# Patient Record
Sex: FEMALE | Race: WHITE | Employment: OTHER | RURAL
[De-identification: names, ages, dates, MRNs, and addresses within clinical notes are randomized per-mention and may not be internally consistent; named-entity substitution may affect disease eponyms.]

---

## 2017-01-12 ENCOUNTER — OFFICE VISIT (OUTPATIENT)
Dept: FAMILY MEDICINE CLINIC | Age: 77
End: 2017-01-12

## 2017-01-12 VITALS
TEMPERATURE: 98.2 F | WEIGHT: 156 LBS | DIASTOLIC BLOOD PRESSURE: 60 MMHG | BODY MASS INDEX: 30.63 KG/M2 | SYSTOLIC BLOOD PRESSURE: 112 MMHG | HEIGHT: 60 IN | HEART RATE: 93 BPM | OXYGEN SATURATION: 94 % | RESPIRATION RATE: 20 BRPM

## 2017-01-12 DIAGNOSIS — E11.9 TYPE 2 DIABETES MELLITUS WITHOUT COMPLICATION, WITHOUT LONG-TERM CURRENT USE OF INSULIN (HCC): Primary | ICD-10-CM

## 2017-01-12 DIAGNOSIS — I10 ESSENTIAL HYPERTENSION: ICD-10-CM

## 2017-01-12 DIAGNOSIS — E78.00 PURE HYPERCHOLESTEROLEMIA: ICD-10-CM

## 2017-01-12 DIAGNOSIS — J06.9 VIRAL UPPER RESPIRATORY TRACT INFECTION: ICD-10-CM

## 2017-01-12 DIAGNOSIS — M1A.9XX0 CHRONIC GOUT WITHOUT TOPHUS, UNSPECIFIED CAUSE, UNSPECIFIED SITE: ICD-10-CM

## 2017-01-12 RX ORDER — CODEINE PHOSPHATE AND GUAIFENESIN 10; 100 MG/5ML; MG/5ML
5 SOLUTION ORAL
Qty: 120 ML | Refills: 1 | Status: SHIPPED | OUTPATIENT
Start: 2017-01-12 | End: 2018-08-20 | Stop reason: ALTCHOICE

## 2017-01-12 NOTE — PROGRESS NOTES
Mikaela Murphy is a 78 y.o. female presenting for/with:    No chief complaint on file. HPI:  Having increased cough, chest congestion, for past 4days, gradually improving. Wet, no heme. No dyspnea, no f/c. Daughter she visited the other day had bronchitis sx. Diabetes. Sugars controlled well since last visit. Running mid 100's on on meter. Hypoglycemia: none  Tolerating current treatment well  Current medications include Metformin ER  2000mg every day and januvia 100mg qd. Lab Results   Component Value Date/Time    Hemoglobin A1c 6.6 09/15/2016 10:15 AM    Hemoglobin A1c 6.6 04/04/2016 12:01 PM    Hemoglobin A1c 6.5 06/11/2015 01:03 PM    Glucose 79 09/15/2016 10:15 AM    Microalb/Creat ratio (ug/mg creat.) 25.5 04/04/2016 12:02 PM    LDL, calculated Comment 09/15/2016 10:15 AM    Creatinine 1.04 09/15/2016 10:15 AM     Lab Results   Component Value Date/Time    Microalb/Creat ratio (ug/mg creat.) 25.5 04/04/2016 12:02 PM    Microalbumin, urine 32.7 04/04/2016 12:02 PM     Lab Results   Component Value Date/Time    Sodium 143 09/15/2016 10:15 AM    Potassium 5.0 09/15/2016 10:15 AM    Chloride 99 09/15/2016 10:15 AM    CO2 26 09/15/2016 10:15 AM    Glucose 79 09/15/2016 10:15 AM    BUN 27 09/15/2016 10:15 AM    Creatinine 1.04 09/15/2016 10:15 AM    BUN/Creatinine ratio 26 09/15/2016 10:15 AM    GFR est AA 61 09/15/2016 10:15 AM    GFR est non-AA 53 09/15/2016 10:15 AM    Calcium 11.3 09/15/2016 10:15 AM     Last eye exam performed 1/23/15, Dr. Hernan Sarmiento at [address] [place] [address]. Ofc, no DR per pt. Last foot exam 11/15 with podiatry in [address] [place] [address], no sores, did have ingrowing R hallux, did well with tx    Hyperlipidemia. Had some more headaches last mo. Held her crestor 5mg for a couple days, sx went away. Has remained on 1/week, jax well. Higher doses have caused myalgias.   Lab Results   Component Value Date/Time    Cholesterol, total 193 09/15/2016 10:15 AM    HDL Cholesterol 31 09/15/2016 10:15 AM    LDL, calculated Comment 09/15/2016 10:15 AM    VLDL, calculated Comment 09/15/2016 10:15 AM    Triglyceride 507 09/15/2016 10:15 AM     Lab Results   Component Value Date/Time    ALT 23 09/15/2016 10:15 AM    AST 22 09/15/2016 10:15 AM    Alk. phosphatase 59 09/15/2016 10:15 AM    Bilirubin, total 0.2 09/15/2016 10:15 AM     PMH, SH, Medications/Allergies: reviewed, on chart. ROS:  Constitutional: No fever, chills or weight loss  Respiratory: No cough, SOB   CV: No chest pain or Palpitations    Visit Vitals    /60 (BP 1 Location: Left arm, BP Patient Position: Sitting)    Pulse 93    Temp 98.2 °F (36.8 °C) (Temporal)    Resp 20    Ht 5' (1.524 m)    Wt 156 lb (70.8 kg)    SpO2 94%    BMI 30.47 kg/m2     Wt Readings from Last 3 Encounters:   01/12/17 156 lb (70.8 kg)   09/15/16 158 lb (71.7 kg)   04/04/16 162 lb (73.5 kg)     Physical Examination: General appearance - alert, well appearing, and in no distress  Mental status - alert, oriented to person, place, and time  Eyes - pupils equal and reactive, extraocular eye movements intact  ENT - bilateral external ears and nose normal. Normal lips  Neck - supple, no significant adenopathy, no thyromegaly or mass  Lymphatics - no palpable lymphadenopathy, no hepatosplenomegaly  Chest - clear to auscultation, no wheezes, rales or rhonchi, symmetric air entry. Heart - normal rate, regular rhythm, normal S1, S2, no murmurs, rubs, clicks or gallops  Extremities - peripheral pulses normal, no pedal edema, no clubbing or cyanosis    A/P:  URI with chest congestion. Mild, improving. Tx cough with robitussin with codeine PRN. DM2  doing well on metformin and Januvia. Plan Check A1c/CMP at Spring visit. Lipids  Norman the crestor 5mg BIW, good lipids on last check. Con't, plan recheck Fall 2017. HTN  Doing well on lisinopril. Con't. F/U 5-6mo for routine visit/prn.

## 2017-01-12 NOTE — MR AVS SNAPSHOT
Visit Information Date & Time Provider Department Dept. Phone Encounter #  
 1/12/2017 10:50 AM Shaye Sykes MD [address] [place] [address] [id number] Follow-up Instructions Return in about 5 months (around 6/12/2017). Upcoming Health Maintenance Date Due  
 GLAUCOMA SCREENING Q2Y 7/28/2007 DTaP/Tdap/Td series (1 - Tdap) 8/5/2009 EYE EXAM RETINAL OR DILATED Q1 1/14/2016 FOOT EXAM Q1 11/18/2016 HEMOGLOBIN A1C Q6M 3/15/2017 MICROALBUMIN Q1 4/4/2017 MEDICARE YEARLY EXAM 4/5/2017 LIPID PANEL Q1 9/15/2017 COLONOSCOPY 2/1/2020 Allergies as of 1/12/2017  Review Complete On: 1/12/2017 By: Shaye Sykes MD  
  
 Severity Noted Reaction Type Reactions Streptomycin  07/16/2013    Unknown (comments) Current Immunizations  Reviewed on 9/15/2016 Name Date Influenza High Dose Vaccine PF 9/8/2016, 10/8/2015 Influenza Vaccine 11/17/2014 Pneumococcal Conjugate (PCV-13) 12/17/2014 Pneumococcal Vaccine (Unspecified Type) 8/4/2009 Td 8/4/2009 Zoster Vaccine, Live 12/11/2015 Not reviewed this visit You Were Diagnosed With   
  
 Codes Comments Type 2 diabetes mellitus without complication, without long-term current use of insulin (HCC)    -  Primary ICD-10-CM: E11.9 ICD-9-CM: 250.00 Chronic gout without tophus, unspecified cause, unspecified site     ICD-10-CM: M1A. 9XX0 
ICD-9-CM: 274.02 Essential hypertension     ICD-10-CM: I10 
ICD-9-CM: 401.9 Pure hypercholesterolemia     ICD-10-CM: E78.00 ICD-9-CM: 272.0 Viral upper respiratory tract infection     ICD-10-CM: J06.9, B97.89 ICD-9-CM: 465.9 Vitals BP Pulse Temp Resp Height(growth percentile) 112/60 (BP 1 Location: Left arm, BP Patient Position: Sitting) 93 98.2 °F (36.8 °C) (Temporal) 20 5' (1.524 m) Weight(growth percentile) SpO2 BMI OB Status Smoking Status 156 lb (70.8 kg) 94% 30.47 kg/m2 Hysterectomy Never Smoker Vitals History BMI and BSA Data Body Mass Index Body Surface Area  
 30.47 kg/m 2 1.73 m 2 Preferred Pharmacy Pharmacy Name Phone [facility], [address] [facility] [phone] Your Updated Medication List  
  
   
This list is accurate as of: 1/12/17 12:37 PM.  Always use your most recent med list.  
  
  
  
  
 ALEVE 220 mg Cap Generic drug:  naproxen sodium Take 1 tablet by mouth daily. allopurinol 300 mg tablet Commonly known as:  ZYLOPRIM  
TAKE ONE TABLET BY MOUTH ONCE DAILY  
  
 aspirin delayed-release 81 mg tablet Take  by mouth daily. glucose blood VI test strips strip Commonly known as:  ASCENSIA AUTODISC VI, ONE TOUCH ULTRA TEST VI  
Use to check sugar daily dx 250.00, on insulin. guaiFENesin-codeine 100-10 mg/5 mL solution Commonly known as:  ROBITUSSIN AC Take 5 mL by mouth three (3) times daily as needed for Cough. Max Daily Amount: 15 mL. Lancets Misc Commonly known as: One Touch Echo Gowen Use to check sugar daily  
  
 lisinopril 20 mg tablet Commonly known as:  PRINIVIL, ZESTRIL  
TAKE ONE TABLET BY MOUTH ONCE DAILY  
  
 magnesium oxide 400 mg tablet Commonly known as:  MAG-OX Take 400 mg by mouth daily. metFORMIN  mg tablet Commonly known as:  GLUCOPHAGE XR Take 4 Tabs by mouth daily. Indications: sugar  
  
 multivitamin tablet Commonly known as:  ONE A DAY Take 1 tablet by mouth daily. rosuvastatin 5 mg tablet Commonly known as:  CRESTOR Take 1 Tab by mouth two (2) times a week. SITagliptin 100 mg tablet Commonly known as:  Thirza Calkin Take 1 Tab by mouth daily. Indications: sugar. Prescriptions Printed Refills  
 guaiFENesin-codeine (ROBITUSSIN AC) 100-10 mg/5 mL solution 1 Sig: Take 5 mL by mouth three (3) times daily as needed for Cough. Max Daily Amount: 15 mL. Class: Print Route: Oral  
  
Follow-up Instructions Return in about 5 months (around 6/12/2017). Patient Instructions Viral Respiratory Infection: Care Instructions Your Care Instructions Viruses are very small organisms. They grow in number after they enter your body. There are many types that cause different illnesses, such as colds and the mumps. The symptoms of a viral respiratory infection often start quickly. They include a fever, sore throat, and runny nose. You may also just not feel well. Or you may not want to eat much. Most viral respiratory infections are not serious. They usually get better with time and self-care. Antibiotics are not used to treat a viral infection. That's because antibiotics will not help cure a viral illness. In some cases, antiviral medicine can help your body fight a serious viral infection. Follow-up care is a key part of your treatment and safety. Be sure to make and go to all appointments, and call your doctor if you are having problems. It's also a good idea to know your test results and keep a list of the medicines you take. How can you care for yourself at home? · Rest as much as possible until you feel better. · Be safe with medicines. Take your medicine exactly as prescribed. Call your doctor if you think you are having a problem with your medicine. You will get more details on the specific medicine your doctor prescribes. · Take an over-the-counter pain medicine, such as acetaminophen (Tylenol), ibuprofen (Advil, Motrin), or naproxen (Aleve), as needed for pain and fever. Read and follow all instructions on the label. Do not give aspirin to anyone younger than 20. It has been linked to Reye syndrome, a serious illness. · Drink plenty of fluids, enough so that your urine is light yellow or clear like water. Hot fluids, such as tea or soup, may help relieve congestion in your nose and throat.  If you have kidney, heart, or liver disease and have to limit fluids, talk with your doctor before you increase the amount of fluids you drink. · Try to clear mucus from your lungs by breathing deeply and coughing. · Gargle with warm salt water once an hour. This can help reduce swelling and throat pain. Use 1 teaspoon of salt mixed in 1 cup of warm water. · Do not smoke or allow others to smoke around you. If you need help quitting, talk to your doctor about stop-smoking programs and medicines. These can increase your chances of quitting for good. To avoid spreading the virus · Cough or sneeze into a tissue. Then throw the tissue away. · If you don't have a tissue, use your hand to cover your cough or sneeze. Then clean your hand. You can also cough into your sleeve. · Wash your hands often. Use soap and warm water. Wash for 15 to 20 seconds each time. · If you don't have soap and water near you, you can clean your hands with alcohol wipes or gel. When should you call for help? Call your doctor now or seek immediate medical care if: 
· You have a new or higher fever. · Your fever lasts more than 48 hours. · You have trouble breathing. · You have a fever with a stiff neck or a severe headache. · You are sensitive to light. · You feel very sleepy or confused. Watch closely for changes in your health, and be sure to contact your doctor if: 
· You do not get better as expected. Where can you learn more? Go to http://mili-garima.info/. Enter H470 in the search box to learn more about \"Viral Respiratory Infection: Care Instructions. \" Current as of: June 30, 2016 Content Version: 11.1 © 9989-1197 [employer]. Care instructions adapted under license by [employer] (which disclaims liability or warranty for this information). If you have questions about a medical condition or this instruction, always ask your healthcare professional. [address] any warranty or liability for your use of this information. Introducing [facility] & HEALTH SERVICES! Dear Briseyda Nelson: Thank you for requesting a GetMaid account. Our records indicate that you already have an active GetMaid account. You can access your account anytime at [email or web address] Did you know that you can access your hospital and ER discharge instructions at any time in GetMaid? You can also review all of your test results from your hospital stay or ER visit. Additional Information If you have questions, please visit the Frequently Asked Questions section of the GetMaid website at [email or web address] Remember, GetMaid is NOT to be used for urgent needs. For medical emergencies, dial 911. Now available from your iPhone and Android! Please provide this summary of care documentation to your next provider. Your primary care clinician is listed as Arsenio Michele. If you have any questions after today's visit, please call [phone].

## 2017-01-12 NOTE — PATIENT INSTRUCTIONS
Viral Respiratory Infection: Care Instructions  Your Care Instructions  Viruses are very small organisms. They grow in number after they enter your body. There are many types that cause different illnesses, such as colds and the mumps. The symptoms of a viral respiratory infection often start quickly. They include a fever, sore throat, and runny nose. You may also just not feel well. Or you may not want to eat much. Most viral respiratory infections are not serious. They usually get better with time and self-care. Antibiotics are not used to treat a viral infection. That's because antibiotics will not help cure a viral illness. In some cases, antiviral medicine can help your body fight a serious viral infection. Follow-up care is a key part of your treatment and safety. Be sure to make and go to all appointments, and call your doctor if you are having problems. It's also a good idea to know your test results and keep a list of the medicines you take. How can you care for yourself at home? · Rest as much as possible until you feel better. · Be safe with medicines. Take your medicine exactly as prescribed. Call your doctor if you think you are having a problem with your medicine. You will get more details on the specific medicine your doctor prescribes. · Take an over-the-counter pain medicine, such as acetaminophen (Tylenol), ibuprofen (Advil, Motrin), or naproxen (Aleve), as needed for pain and fever. Read and follow all instructions on the label. Do not give aspirin to anyone younger than 20. It has been linked to Reye syndrome, a serious illness. · Drink plenty of fluids, enough so that your urine is light yellow or clear like water. Hot fluids, such as tea or soup, may help relieve congestion in your nose and throat. If you have kidney, heart, or liver disease and have to limit fluids, talk with your doctor before you increase the amount of fluids you drink.   · Try to clear mucus from your lungs by breathing deeply and coughing. · Gargle with warm salt water once an hour. This can help reduce swelling and throat pain. Use 1 teaspoon of salt mixed in 1 cup of warm water. · Do not smoke or allow others to smoke around you. If you need help quitting, talk to your doctor about stop-smoking programs and medicines. These can increase your chances of quitting for good. To avoid spreading the virus  · Cough or sneeze into a tissue. Then throw the tissue away. · If you don't have a tissue, use your hand to cover your cough or sneeze. Then clean your hand. You can also cough into your sleeve. · Wash your hands often. Use soap and warm water. Wash for 15 to 20 seconds each time. · If you don't have soap and water near you, you can clean your hands with alcohol wipes or gel. When should you call for help? Call your doctor now or seek immediate medical care if:  · You have a new or higher fever. · Your fever lasts more than 48 hours. · You have trouble breathing. · You have a fever with a stiff neck or a severe headache. · You are sensitive to light. · You feel very sleepy or confused. Watch closely for changes in your health, and be sure to contact your doctor if:  · You do not get better as expected. Where can you learn more? Go to http://mili-garima.info/. Enter D017 in the search box to learn more about \"Viral Respiratory Infection: Care Instructions. \"  Current as of: June 30, 2016  Content Version: 11.1  © 7438-6856 [employer]. Care instructions adapted under license by [employer] (which disclaims liability or warranty for this information). If you have questions about a medical condition or this instruction, always ask your healthcare professional. [address] any warranty or liability for your use of this information.

## 2017-03-22 DIAGNOSIS — E11.9 TYPE 2 DIABETES MELLITUS WITHOUT COMPLICATION, UNSPECIFIED LONG TERM INSULIN USE STATUS: ICD-10-CM

## 2017-03-22 RX ORDER — ALLOPURINOL 300 MG/1
TABLET ORAL
Qty: 90 TAB | Refills: 3 | Status: SHIPPED | OUTPATIENT
Start: 2017-03-22 | End: 2018-02-12 | Stop reason: SDUPTHER

## 2017-03-22 RX ORDER — METFORMIN HYDROCHLORIDE 500 MG/1
2000 TABLET, EXTENDED RELEASE ORAL DAILY
Qty: 360 TAB | Refills: 3 | Status: SHIPPED | OUTPATIENT
Start: 2017-03-22 | End: 2018-02-28 | Stop reason: SDUPTHER

## 2017-05-04 DIAGNOSIS — E78.00 PURE HYPERCHOLESTEROLEMIA: ICD-10-CM

## 2017-05-04 RX ORDER — ROSUVASTATIN CALCIUM 5 MG/1
5 TABLET, COATED ORAL 2 TIMES WEEKLY
Qty: 30 TAB | Refills: 11 | Status: SHIPPED | OUTPATIENT
Start: 2017-05-05 | End: 2018-05-11 | Stop reason: SDUPTHER

## 2017-06-12 ENCOUNTER — OFFICE VISIT (OUTPATIENT)
Dept: FAMILY MEDICINE CLINIC | Age: 77
End: 2017-06-12

## 2017-06-12 VITALS
BODY MASS INDEX: 32.1 KG/M2 | HEIGHT: 60 IN | SYSTOLIC BLOOD PRESSURE: 146 MMHG | WEIGHT: 163.5 LBS | OXYGEN SATURATION: 95 % | TEMPERATURE: 98.8 F | RESPIRATION RATE: 16 BRPM | DIASTOLIC BLOOD PRESSURE: 72 MMHG | HEART RATE: 90 BPM

## 2017-06-12 DIAGNOSIS — Z00.00 ROUTINE GENERAL MEDICAL EXAMINATION AT A HEALTH CARE FACILITY: Primary | ICD-10-CM

## 2017-06-12 DIAGNOSIS — Z13.39 SCREENING FOR ALCOHOLISM: ICD-10-CM

## 2017-06-12 DIAGNOSIS — E78.00 PURE HYPERCHOLESTEROLEMIA: ICD-10-CM

## 2017-06-12 DIAGNOSIS — E11.9 TYPE 2 DIABETES MELLITUS WITHOUT COMPLICATION, UNSPECIFIED LONG TERM INSULIN USE STATUS: ICD-10-CM

## 2017-06-12 DIAGNOSIS — Z13.31 SCREENING FOR DEPRESSION: ICD-10-CM

## 2017-06-12 DIAGNOSIS — I10 ESSENTIAL HYPERTENSION: ICD-10-CM

## 2017-06-12 NOTE — MR AVS SNAPSHOT
Visit Information Date & Time Provider Department Dept. Phone Encounter #  
 6/12/2017 11:10 AM Oskar Cobb MD [address] [place] [address] [id number] Follow-up Instructions Return in about 6 months (around 12/12/2017). Follow-up and Disposition History Your Appointments 11/6/2017 10:30 AM  
ESTABLISHED PATIENT with Oskar Cobb MD  
[place] [address] ([facility]) Appt Note: 5 month f/u  
 [address] [facility] [address] [facility],5[address] [address] [phone]  
  
   
 [address] [facility] [address] [facility],5Th Floor [address] Upcoming Health Maintenance Date Due  
 FOOT EXAM Q1 11/18/2016 HEMOGLOBIN A1C Q6M 3/15/2017 MICROALBUMIN Q1 4/4/2017 MEDICARE YEARLY EXAM 4/5/2017 INFLUENZA AGE 9 TO ADULT 8/1/2017 LIPID PANEL Q1 9/15/2017 EYE EXAM RETINAL OR DILATED Q1 3/20/2018 GLAUCOMA SCREENING Q2Y 3/20/2019 COLONOSCOPY 2/1/2020 DTaP/Tdap/Td series (2 - Td) 6/12/2027 Allergies as of 6/12/2017  Review Complete On: 6/12/2017 By: Joycelyn Kasper LPN Severity Noted Reaction Type Reactions Streptomycin  07/16/2013    Unknown (comments) Current Immunizations  Reviewed on 9/15/2016 Name Date Influenza High Dose Vaccine PF 9/8/2016, 10/8/2015 Influenza Vaccine 11/17/2014 Pneumococcal Conjugate (PCV-13) 12/17/2014 Pneumococcal Vaccine (Unspecified Type) 8/4/2009 Td 8/4/2009 Zoster Vaccine, Live 12/11/2015 Not reviewed this visit You Were Diagnosed With   
  
 Codes Comments Routine general medical examination at a health care facility    -  Primary ICD-10-CM: Z00.00 ICD-9-CM: V70.0 Type 2 diabetes mellitus without complication, unspecified long term insulin use status     ICD-10-CM: E11.9 ICD-9-CM: 250.00 Essential hypertension     ICD-10-CM: I10 
ICD-9-CM: 401.9 Pure hypercholesterolemia     ICD-10-CM: E78.00 ICD-9-CM: 272.0  Screening for alcoholism     ICD-10-CM: Z13.89 
 ICD-9-CM: V79.1 Screening for depression     ICD-10-CM: Z13.89 ICD-9-CM: V79.0 Vitals BP Pulse Temp Resp Height(growth percentile) Weight(growth percentile) 146/72 90 98.8 °F (37.1 °C) (Oral) 16 5' (1.524 m) 163 lb 8 oz (74.2 kg) SpO2 BMI OB Status Smoking Status 95% 31.93 kg/m2 Hysterectomy Never Smoker Vitals History BMI and BSA Data Body Mass Index Body Surface Area  
 31.93 kg/m 2 1.77 m 2 Preferred Pharmacy Pharmacy Name Phone [facility] [address] [facility], [address] [facility] [phone] Your Updated Medication List  
  
   
This list is accurate as of: 6/12/17  1:27 PM.  Always use your most recent med list.  
  
  
  
  
 ALEVE 220 mg Cap Generic drug:  naproxen sodium Take 1 tablet by mouth daily. allopurinol 300 mg tablet Commonly known as:  ZYLOPRIM  
TAKE ONE TABLET BY MOUTH ONCE DAILY to prevent gout  
  
 aspirin delayed-release 81 mg tablet Take  by mouth daily. glucose blood VI test strips strip Commonly known as:  ASCENSIA AUTODISC VI, ONE TOUCH ULTRA TEST VI  
Use to check sugar daily dx 250.00, on insulin. guaiFENesin-codeine 100-10 mg/5 mL solution Commonly known as:  ROBITUSSIN AC Take 5 mL by mouth three (3) times daily as needed for Cough. Max Daily Amount: 15 mL. Lancets Misc Commonly known as: One Touch Zollie Right Use to check sugar daily  
  
 lisinopril 20 mg tablet Commonly known as:  PRINIVIL, ZESTRIL  
TAKE ONE TABLET BY MOUTH ONCE DAILY  
  
 magnesium oxide 400 mg tablet Commonly known as:  MAG-OX Take 400 mg by mouth daily. metFORMIN  mg tablet Commonly known as:  GLUCOPHAGE XR Take 4 Tabs by mouth daily. Indications: sugar  
  
 multivitamin tablet Commonly known as:  ONE A DAY Take 1 tablet by mouth daily. rosuvastatin 5 mg tablet Commonly known as:  CRESTOR Take 1 Tab by mouth two (2) times a week. SITagliptin 100 mg tablet Commonly known as:  Crystal Rayo Take 1 Tab by mouth daily. Indications: sugar. We Performed the Following AMB POC URINE, MICROALBUMIN, SEMIQUANT (3 RESULTS) [84231 CPT(R)] Efren 68 [[id number] [facility]] HEMOGLOBIN A1C WITH EAG [30820 CPT(R)]  DIABETES FOOT EXAM [7 Custom] METABOLIC PANEL, COMPREHENSIVE [52691 CPT(R)] Follow-up Instructions Return in about 6 months (around 12/12/2017). Patient Instructions Medicare Wellness Visit, Female The best way to live healthy is to have a healthy lifestyle by eating a well-balanced diet, exercising regularly, limiting alcohol and stopping smoking. Regular physical exams and screening tests are another way to keep healthy. Preventive exams provided by your health care provider can find health problems before they become diseases or illnesses. Preventive services including immunizations, screening tests, monitoring and exams can help you take care of your own health. All people over age 72 should have a pneumovax  and and a prevnar shot to prevent pneumonia. These are once in a lifetime unless you and your provider decide differently. All people over 65 should have a yearly flu shot and a tetanus vaccine every 10 years. A bone mass density to screen for osteoporosis or thinning of the bones should be done every 2 years after 65. Screening for diabetes mellitus with a blood sugar test should be done every year. Glaucoma is a disease of the eye due to increased ocular pressure that can lead to blindness and it should be done every year by an eye professional. 
 
Cardiovascular screening tests that check for elevated lipids (fatty part of blood) which can lead to heart disease and strokes should be done every 5 years.  
 
Colorectal screening that evaluates for blood or polyps in your colon should be done yearly as a stool test or every five years as a flexible sigmoidoscope or every 10 years as a colonoscopy up to age 76. Breast cancer screening with a mammogram is recommended biennially  for women age 54-69. Screening for cervical cancer with a pap smear and pelvic exam is recommended for women after age 72 years every 2 years up to age 79 or when the provider and patient decide to stop. If there is a history of cervical abnormalities or other increased risk for cancer then the test is recommended yearly. Hepatitis C screening is also recommended for anyone born between [birth year] through [address]. A shingles vaccine is also recommended once in a lifetime after age 61. Your Medicare Wellness Exam is recommended annually. Here is a list of your current Health Maintenance items with a due date: 
Health Maintenance Due Topic Date Due  
 Diabetic Foot Care  11/18/2016  Hemoglobin A1C    03/15/2017  Albumin Urine Test  04/04/2017 [facility] Annual Well Visit  04/05/2017 Introducing [facility] & HEALTH SERVICES! Dear Radha Shelton: Thank you for requesting a Ge.[email or web address] account. Our records indicate that you already have an active [email or web address] account. You can access your account anytime at [email or web address] Did you know that you can access your hospital and ER discharge instructions at any time in [email or web address]? You can also review all of your test results from your hospital stay or ER visit. Additional Information If you have questions, please visit the Frequently Asked Questions section of the [email or web address] website at [email or web address] Remember, [email or web address] is NOT to be used for urgent needs. For medical emergencies, dial 911. Now available from your iPhone and Android! Please provide this summary of care documentation to your next provider. Your primary care clinician is listed as Karina Solis. If you have any questions after today's visit, please call [phone].

## 2017-06-12 NOTE — PROGRESS NOTES
Marychuy Mcdaniel is a 78 y.o. female presenting for/with: Annual Wellness Visit    HPI:  Diabetes. Sugars controlled well since last visit. Running mid 100's on on meter. Hypoglycemia: none  Tolerating current treatment well  Current medications include Metformin ER  2000mg every day and januvia 100mg qd. Lab Results   Component Value Date/Time    Hemoglobin A1c 6.6 09/15/2016 10:15 AM    Hemoglobin A1c 6.6 04/04/2016 12:01 PM    Hemoglobin A1c 6.5 06/11/2015 01:03 PM    Glucose 79 09/15/2016 10:15 AM    Microalb/Creat ratio (ug/mg creat.) 25.5 04/04/2016 12:02 PM    LDL, calculated Comment 09/15/2016 10:15 AM    Creatinine 1.04 09/15/2016 10:15 AM     Lab Results   Component Value Date/Time    Microalb/Creat ratio (ug/mg creat.) 25.5 04/04/2016 12:02 PM    Microalbumin, urine 32.7 04/04/2016 12:02 PM     Lab Results   Component Value Date/Time    Sodium 143 09/15/2016 10:15 AM    Potassium 5.0 09/15/2016 10:15 AM    Chloride 99 09/15/2016 10:15 AM    CO2 26 09/15/2016 10:15 AM    Glucose 79 09/15/2016 10:15 AM    BUN 27 09/15/2016 10:15 AM    Creatinine 1.04 09/15/2016 10:15 AM    BUN/Creatinine ratio 26 09/15/2016 10:15 AM    GFR est AA 61 09/15/2016 10:15 AM    GFR est non-AA 53 09/15/2016 10:15 AM    Calcium 11.3 09/15/2016 10:15 AM     Last eye exam performed 3/17, Dr. Lou Ramachandran at [address] [place] [address]. Ofc, no DR per pt. Last foot exam 11/15 with podiatry in [address] [place] [address], no sores, did have ingrowing R hallux, did well with tx    Hyperlipidemia. Had some more headaches last mo. Held her crestor 5mg for a couple days, sx went away. Has remained on 1/week, jax well. Higher doses have caused myalgias.   Lab Results   Component Value Date/Time    Cholesterol, total 193 09/15/2016 10:15 AM    HDL Cholesterol 31 09/15/2016 10:15 AM    LDL, calculated Comment 09/15/2016 10:15 AM    VLDL, calculated Comment 09/15/2016 10:15 AM    Triglyceride 507 09/15/2016 10:15 AM     Lab Results   Component Value Date/Time    ALT (SGPT) 23 09/15/2016 10:15 AM    AST (SGOT) 22 09/15/2016 10:15 AM    Alk. phosphatase 59 09/15/2016 10:15 AM    Bilirubin, total 0.2 09/15/2016 10:15 AM     PMH, SH, Medications/Allergies: reviewed, on chart. ROS:  Constitutional: No fever, chills or weight loss  Respiratory: No cough, SOB   CV: No chest pain or Palpitations    Visit Vitals    /72    Pulse 90    Temp 98.8 °F (37.1 °C) (Oral)  Comment (Src): patient was drinking hot coffee when i first toook her tempa    Resp 16    Ht 5' (1.524 m)    Wt 163 lb 8 oz (74.2 kg)    SpO2 95%    BMI 31.93 kg/m2     Wt Readings from Last 3 Encounters:   06/12/17 163 lb 8 oz (74.2 kg)   01/12/17 156 lb (70.8 kg)   09/15/16 158 lb (71.7 kg)     Physical Examination: General appearance - alert, well appearing, and in no distress  Mental status - alert, oriented to person, place, and time  Eyes - pupils equal and reactive, extraocular eye movements intact  ENT - bilateral external ears and nose normal. Normal lips  Neck - supple, no significant adenopathy, no thyromegaly or mass  Lymphatics - no palpable lymphadenopathy, no hepatosplenomegaly  Chest - clear to auscultation, no wheezes, rales or rhonchi, symmetric air entry. Heart - normal rate, regular rhythm, normal S1, S2, no murmurs, rubs, clicks or gallops  Extremities - peripheral pulses normal, no pedal edema, no clubbing or cyanosis. Foot check: No calluses, no tinea. DP, PT pulses 2+ bilat. Monofilament test normal bilat. A/P:  DM2  doing well on metformin and Januvia. Check A1c/CMP. Lipids  Norman the crestor 5mg BIW, good lipids on last check. Con't, plan recheck Fall 2017. HTN  Doing well on lisinopril. Con't. F/U 5-6mo for routine visit/prn.    ______________________________________________________________________    Mayco Jurado is a 78 y.o. female and presents for annual Medicare Wellness Visit. Problem List: Reviewed with patient and discussed risk factors.     Patient Active Problem List   Diagnosis Code    S/P TKR (total knee replacement) Z96.659    Hyperlipidemia E78.5    OP (osteoporosis) M81.0    Hypercalcemia E83.52    CTS (carpal tunnel syndrome) G56.00    Gout M10.9    DM2 (diabetes mellitus, type 2) (HCC) E11.9    HTN (hypertension) I10    History of colonoscopy C91.161    Advance directive discussed with patient Z71.89       Current medical providers:  Patient Care Team:  Laura Noyola NP as PCP - General (Nurse Practitioner)    PMH, SH, Medications/Allergies: reviewed, on chart. Female Alcohol Screening: On any occasion during the past 3 months, have you had more than 3 drinks containing alcohol? No    Do you average more than 7 drinks per week? No    ROS:  Constitutional: No fever, chills or weight loss  Respiratory: No cough, SOB   CV: No chest pain or Palpitations    Objective:  Visit Vitals    /72    Pulse 90    Temp 98.8 °F (37.1 °C) (Oral)  Comment (Src): patient was drinking hot coffee when i first toook her tempa    Resp 16    Ht 5' (1.524 m)    Wt 163 lb 8 oz (74.2 kg)    SpO2 95%    BMI 31.93 kg/m2    Body mass index is 31.93 kg/(m^2). Assessment of cognitive impairment: Alert and oriented x 3    Depression Screen:   PHQ over the last two weeks 6/12/2017   PHQ Not Done -   Little interest or pleasure in doing things Not at all   Feeling down, depressed or hopeless Not at all   Total Score PHQ 2 0       Fall Risk Assessment:    Fall Risk Assessment, last 12 mths 6/12/2017   Able to walk? Yes   Fall in past 12 months? No     Functional Ability:   Does the patient exhibit a steady gait? yes   How long did it take the patient to get up and walk from a sitting position? 1s   Is the patient self reliant?  (ie can do own laundry, meals, household chores)  yes     Does the patient handle his/her own medications? yes     Does the patient handle his/her own money?    yes     Is the patients home safe (ie good lighting, handrails on stairs and bath, etc.)? yes     Did you notice or did patient express any hearing difficulties? no     Did you notice or did patient express any vision difficulties? no       Advance Care Planning:   Patient was offered the opportunity to discuss advance care planning:  yes     Does patient have an Advance Directive:  yes   If no, did you provide information on Caring Connections?  no       Plan:      Orders Placed This Encounter    Depression Screen Annual    HEMOGLOBIN A1C WITH EAG    METABOLIC PANEL, COMPREHENSIVE    AMB POC URINE, MICROALBUMIN, SEMIQUANT (3 RESULTS)     DIABETES FOOT EXAM       Health Maintenance   Topic Date Due    FOOT EXAM Q1  11/18/2016    HEMOGLOBIN A1C Q6M  03/15/2017    MICROALBUMIN Q1  04/04/2017    MEDICARE YEARLY EXAM  04/05/2017    INFLUENZA AGE 9 TO ADULT  08/01/2017    LIPID PANEL Q1  09/15/2017    EYE EXAM RETINAL OR DILATED Q1  03/20/2018    GLAUCOMA SCREENING Q2Y  03/20/2019    COLONOSCOPY  02/01/2020    DTaP/Tdap/Td series (2 - Td) 06/12/2027    OSTEOPOROSIS SCREENING (DEXA)  Completed    ZOSTER VACCINE AGE 60>  Completed    Pneumococcal 65+ Low/Medium Risk  Completed       *Patient verbalized understanding and agreement with the plan. A copy of the After Visit Summary with personalized health plan was given to the patient today.

## 2017-06-12 NOTE — ACP (ADVANCE CARE PLANNING)
Pt has advance directive at home. Recommended to bring by the clinic for inclusion in chart at patient's convenience.  Discussed 6/12/2017

## 2017-06-12 NOTE — PATIENT INSTRUCTIONS

## 2017-06-13 LAB
ALBUMIN SERPL-MCNC: 5.1 G/DL (ref 3.5–4.8)
ALBUMIN/GLOB SERPL: 1.9 {RATIO} (ref 1.2–2.2)
ALP SERPL-CCNC: 68 IU/L (ref 39–117)
ALT SERPL-CCNC: 44 IU/L (ref 0–32)
AST SERPL-CCNC: 37 IU/L (ref 0–40)
BILIRUB SERPL-MCNC: 0.4 MG/DL (ref 0–1.2)
BUN SERPL-MCNC: 21 MG/DL (ref 8–27)
BUN/CREAT SERPL: 30 (ref 12–28)
CALCIUM SERPL-MCNC: 11.9 MG/DL (ref 8.7–10.3)
CHLORIDE SERPL-SCNC: 96 MMOL/L (ref 96–106)
CO2 SERPL-SCNC: 24 MMOL/L (ref 18–29)
CREAT SERPL-MCNC: 0.7 MG/DL (ref 0.57–1)
EST. AVERAGE GLUCOSE BLD GHB EST-MCNC: 151 MG/DL
GLOBULIN SER CALC-MCNC: 2.7 G/DL (ref 1.5–4.5)
GLUCOSE SERPL-MCNC: 85 MG/DL (ref 65–99)
HBA1C MFR BLD: 6.9 % (ref 4.8–5.6)
POTASSIUM SERPL-SCNC: 5.1 MMOL/L (ref 3.5–5.2)
PROT SERPL-MCNC: 7.8 G/DL (ref 6–8.5)
SODIUM SERPL-SCNC: 139 MMOL/L (ref 134–144)

## 2017-11-06 ENCOUNTER — OFFICE VISIT (OUTPATIENT)
Dept: FAMILY MEDICINE CLINIC | Age: 77
End: 2017-11-06

## 2017-11-06 VITALS
OXYGEN SATURATION: 97 % | TEMPERATURE: 98.7 F | DIASTOLIC BLOOD PRESSURE: 54 MMHG | WEIGHT: 158 LBS | HEART RATE: 83 BPM | BODY MASS INDEX: 31.02 KG/M2 | HEIGHT: 60 IN | SYSTOLIC BLOOD PRESSURE: 139 MMHG

## 2017-11-06 DIAGNOSIS — E11.9 TYPE 2 DIABETES MELLITUS WITHOUT COMPLICATION, WITHOUT LONG-TERM CURRENT USE OF INSULIN (HCC): Primary | ICD-10-CM

## 2017-11-06 DIAGNOSIS — E78.00 PURE HYPERCHOLESTEROLEMIA: ICD-10-CM

## 2017-11-06 DIAGNOSIS — I10 ESSENTIAL HYPERTENSION: ICD-10-CM

## 2017-11-06 LAB
ALBUMIN UR QL STRIP: 10 MG/L
CREATININE, URINE POC: 100 MG/DL
MICROALBUMIN/CREAT RATIO POC: <30 MG/G

## 2017-11-06 RX ORDER — CHOLECALCIFEROL (VITAMIN D3) 125 MCG
CAPSULE ORAL
COMMUNITY
End: 2019-05-20 | Stop reason: ALTCHOICE

## 2017-11-06 RX ORDER — ASCORBIC ACID 500 MG
500 TABLET ORAL 2 TIMES DAILY
COMMUNITY

## 2017-11-06 NOTE — MR AVS SNAPSHOT
Visit Information Date & Time Provider Department Dept. Phone Encounter #  
 11/6/2017 10:30 AM Merlin Larios MD Sean Jul[facility] R[facility] [id number] Follow-up Instructions Return in about 6 months (around 5/6/2018). Upcoming Health Maintenance Date Due MICROALBUMIN Q1 4/4/2017 LIPID PANEL Q1 9/15/2017 HEMOGLOBIN A1C Q6M 12/12/2017 EYE EXAM RETINAL OR DILATED Q1 3/20/2018 FOOT EXAM Q1 6/12/2018 MEDICARE YEARLY EXAM 6/13/2018 GLAUCOMA SCREENING Q2Y 3/20/2019 COLONOSCOPY 2/1/2020 DTaP/Tdap/Td series (2 - Td) 6/12/2027 Allergies as of 11/6/2017  Review Complete On: 11/6/2017 By: Merlin Larios MD  
  
 Severity Noted Reaction Type Reactions Streptomycin  07/16/2013    Unknown (comments) Current Immunizations  Reviewed on 9/15/2016 Name Date Influenza High Dose Vaccine PF 8/10/2017, 9/8/2016, 10/8/2015 Influenza Vaccine 11/17/2014 Pneumococcal Conjugate (PCV-13) 12/17/2014 Pneumococcal Vaccine (Unspecified Type) 8/4/2009 Td 8/4/2009 Zoster Vaccine, Live 12/11/2015 Not reviewed this visit You Were Diagnosed With   
  
 Codes Comments Type 2 diabetes mellitus without complication, without long-term current use of insulin (HCC)    -  Primary ICD-10-CM: E11.9 ICD-9-CM: 250.00 Essential hypertension     ICD-10-CM: I10 
ICD-9-CM: 401.9 Pure hypercholesterolemia     ICD-10-CM: E78.00 ICD-9-CM: 272.0 Vitals BP Pulse Temp Height(growth percentile) Weight(growth percentile) 139/54 (BP 1 Location: Right arm, BP Patient Position: Sitting) 83 98.7 °F (37.1 °C) (Temporal) 5' (1.524 m) 158 lb (71.7 kg) SpO2 BMI OB Status Smoking Status 97% 30.86 kg/m2 Hysterectomy Never Smoker Vitals History BMI and BSA Data Body Mass Index Body Surface Area  
 30.86 kg/m 2 1.74 m 2 Preferred Pharmacy Pharmacy Name Phone [facility] [address] [facility], [address] [facility] [phone] Your Updated Medication List  
  
   
This list is accurate as of: 11/6/17 12:01 PM.  Always use your most recent med list.  
  
  
  
  
 ALEVE 220 mg Cap Generic drug:  naproxen sodium Take 1 tablet by mouth daily. allopurinol 300 mg tablet Commonly known as:  ZYLOPRIM  
TAKE ONE TABLET BY MOUTH ONCE DAILY to prevent gout  
  
 aspirin delayed-release 81 mg tablet Take  by mouth daily. glucose blood VI test strips strip Commonly known as:  ASCENSIA AUTODISC VI, ONE TOUCH ULTRA TEST VI  
Use to check sugar daily dx 250.00, on insulin. guaiFENesin-codeine 100-10 mg/5 mL solution Commonly known as:  ROBITUSSIN AC Take 5 mL by mouth three (3) times daily as needed for Cough. Max Daily Amount: 15 mL. Lancets Misc Commonly known as: One Touch Yung Galileo Use to check sugar daily  
  
 lisinopril 20 mg tablet Commonly known as:  PRINIVIL, ZESTRIL  
TAKE ONE TABLET BY MOUTH ONCE DAILY  
  
 magnesium oxide 400 mg tablet Commonly known as:  MAG-OX Take 400 mg by mouth daily. metFORMIN  mg tablet Commonly known as:  GLUCOPHAGE XR Take 4 Tabs by mouth daily. Indications: sugar  
  
 multivitamin tablet Commonly known as:  ONE A DAY Take 1 tablet by mouth daily. potassium 99 mg tablet Take 99 mg by mouth daily. rosuvastatin 5 mg tablet Commonly known as:  CRESTOR Take 1 Tab by mouth two (2) times a week. SITagliptin 100 mg tablet Commonly known as:  Mordecai Heads Take 1 Tab by mouth daily. Indications: sugar. VITAMIN C 500 mg tablet Generic drug:  ascorbic acid (vitamin C) Take 500 mg by mouth two (2) times a day. VITAMIN D3 2,000 unit Tab Generic drug:  cholecalciferol (vitamin D3) Take  by mouth. We Performed the Following AMB POC URINE, MICROALBUMIN, SEMIQUANT (3 RESULTS) [78489 CPT(R)] HEMOGLOBIN A1C WITH EAG [59590 CPT(R)] LIPID PANEL [27305 CPT(R)] METABOLIC PANEL, BASIC [18625 CPT(R)] Follow-up Instructions Return in about 6 months (around 5/6/2018). Patient Instructions Keep up the good work! Introducing [facility] & HEALTH SERVICES! Dear Ashely Gan: Thank you for requesting a Voxli account. Our records indicate that you already have an active Voxli account. You can access your account anytime at [email or web address] Did you know that you can access your hospital and ER discharge instructions at any time in Voxli? You can also review all of your test results from your hospital stay or ER visit. Additional Information If you have questions, please visit the Frequently Asked Questions section of the Voxli website at [email or web address] Remember, Voxli is NOT to be used for urgent needs. For medical emergencies, dial 911. Now available from your iPhone and Android! Please provide this summary of care documentation to your next provider. Your primary care clinician is listed as Silver Woodward. If you have any questions after today's visit, please call [phone].

## 2017-11-06 NOTE — PROGRESS NOTES
Gisela Estrada is a 78 y.o. female presenting for/with:    Diabetes (5 MO CHECK)    HPI:  Diabetes. Sugars controlled well since last visit. Running mid 100's on on meter. Hypoglycemia: none  Tolerating current treatment well  Current medications include Metformin ER  2000mg every day and januvia 100mg qd. Lab Results   Component Value Date/Time    Hemoglobin A1c 6.9 06/12/2017 12:46 PM    Hemoglobin A1c 6.6 09/15/2016 10:15 AM    Hemoglobin A1c 6.6 04/04/2016 12:01 PM    Glucose 85 06/12/2017 12:46 PM    Microalb/Creat ratio (ug/mg creat.) 25.5 04/04/2016 12:02 PM    LDL, calculated Comment 09/15/2016 10:15 AM    Creatinine 0.70 06/12/2017 12:46 PM     Lab Results   Component Value Date/Time    Microalb/Creat ratio (ug/mg creat.) 25.5 04/04/2016 12:02 PM     Lab Results   Component Value Date/Time    Sodium 139 06/12/2017 12:46 PM    Potassium 5.1 06/12/2017 12:46 PM    Chloride 96 06/12/2017 12:46 PM    CO2 24 06/12/2017 12:46 PM    Glucose 85 06/12/2017 12:46 PM    BUN 21 06/12/2017 12:46 PM    Creatinine 0.70 06/12/2017 12:46 PM    BUN/Creatinine ratio 30 06/12/2017 12:46 PM    GFR est AA 99 06/12/2017 12:46 PM    GFR est non-AA 86 06/12/2017 12:46 PM    Calcium 11.9 06/12/2017 12:46 PM     Last eye exam performed 3/17, Dr. Derrell Granados at [address]. Ofc, no DR per pt. Last foot exam 5/2017. Nl MF. Hyperlipidemia. Had some more headaches last mo. Held her crestor 5mg for a couple days, sx went away. Has remained on 1/week, jax well. Higher doses have caused myalgias. Lab Results   Component Value Date/Time    Cholesterol, total 193 09/15/2016 10:15 AM    HDL Cholesterol 31 09/15/2016 10:15 AM    LDL, calculated Comment 09/15/2016 10:15 AM    VLDL, calculated Comment 09/15/2016 10:15 AM    Triglyceride 507 09/15/2016 10:15 AM     Lab Results   Component Value Date/Time    ALT (SGPT) 44 06/12/2017 12:46 PM    AST (SGOT) 37 06/12/2017 12:46 PM    Alk.  phosphatase 68 06/12/2017 12:46 PM    Bilirubin, total 0.4 06/12/2017 12:46 PM     PMH, SH, Medications/Allergies: reviewed, on chart. ROS:  Constitutional: No fever, chills or weight loss  Respiratory: No cough, SOB   CV: No chest pain or Palpitations    Visit Vitals    /54 (BP 1 Location: Right arm, BP Patient Position: Sitting)    Pulse 83    Temp 98.7 °F (37.1 °C) (Temporal)    Ht 5' (1.524 m)    Wt 158 lb (71.7 kg)    SpO2 97%    BMI 30.86 kg/m2     Wt Readings from Last 3 Encounters:   11/06/17 158 lb (71.7 kg)   06/12/17 163 lb 8 oz (74.2 kg)   01/12/17 156 lb (70.8 kg)   -5#  BP Readings from Last 3 Encounters:   11/06/17 139/54   06/12/17 146/72   01/12/17 112/60     Physical Examination: General appearance - alert, well appearing, and in no distress  Mental status - alert, oriented to person, place, and time  Eyes - pupils equal and reactive, extraocular eye movements intact  ENT - bilateral external ears and nose normal. Normal lips  Neck - supple, no significant adenopathy, no thyromegaly or mass  Lymphatics - no palpable lymphadenopathy, no hepatosplenomegaly  Chest - clear to auscultation, no wheezes, rales or rhonchi, symmetric air entry. Heart - normal rate, regular rhythm, normal S1, S2, no murmurs, rubs, clicks or gallops  Extremities - peripheral pulses normal, no pedal edema, no clubbing or cyanosis. A/P:  DM2  doing well on metformin and Januvia. Check A1c/BMP/varsha/cr. Lipids  Norman the crestor 5mg BIW, good lipids on last check. Con't, recheck today. HTN  Ok on recheck. con't plain lisinopril 20mg every day. F/U 5-6mo for routine visit/prn.

## 2017-11-07 LAB
BUN SERPL-MCNC: 16 MG/DL (ref 8–27)
BUN/CREAT SERPL: 20 (ref 12–28)
CALCIUM SERPL-MCNC: 11.2 MG/DL (ref 8.7–10.3)
CHLORIDE SERPL-SCNC: 99 MMOL/L (ref 96–106)
CHOLEST SERPL-MCNC: 206 MG/DL (ref 100–199)
CO2 SERPL-SCNC: 23 MMOL/L (ref 18–29)
CREAT SERPL-MCNC: 0.81 MG/DL (ref 0.57–1)
EST. AVERAGE GLUCOSE BLD GHB EST-MCNC: 146 MG/DL
GFR SERPLBLD CREATININE-BSD FMLA CKD-EPI: 71 ML/MIN/1.73
GFR SERPLBLD CREATININE-BSD FMLA CKD-EPI: 82 ML/MIN/1.73
GLUCOSE SERPL-MCNC: 69 MG/DL (ref 65–99)
HBA1C MFR BLD: 6.7 % (ref 4.8–5.6)
HDLC SERPL-MCNC: 37 MG/DL
LDLC SERPL CALC-MCNC: ABNORMAL MG/DL (ref 0–99)
POTASSIUM SERPL-SCNC: 4.8 MMOL/L (ref 3.5–5.2)
SODIUM SERPL-SCNC: 142 MMOL/L (ref 134–144)
TRIGL SERPL-MCNC: 503 MG/DL (ref 0–149)
VLDLC SERPL CALC-MCNC: ABNORMAL MG/DL (ref 5–40)

## 2018-02-28 DIAGNOSIS — E11.9 TYPE 2 DIABETES MELLITUS WITHOUT COMPLICATION, UNSPECIFIED LONG TERM INSULIN USE STATUS: ICD-10-CM

## 2018-02-28 RX ORDER — METFORMIN HYDROCHLORIDE 500 MG/1
TABLET, EXTENDED RELEASE ORAL
Qty: 360 TAB | Refills: 3 | Status: SHIPPED | OUTPATIENT
Start: 2018-02-28 | End: 2018-05-11 | Stop reason: SDUPTHER

## 2018-05-11 ENCOUNTER — OFFICE VISIT (OUTPATIENT)
Dept: FAMILY MEDICINE CLINIC | Age: 78
End: 2018-05-11

## 2018-05-11 VITALS
BODY MASS INDEX: 31.8 KG/M2 | OXYGEN SATURATION: 99 % | DIASTOLIC BLOOD PRESSURE: 70 MMHG | RESPIRATION RATE: 12 BRPM | HEIGHT: 60 IN | HEART RATE: 99 BPM | SYSTOLIC BLOOD PRESSURE: 124 MMHG | WEIGHT: 162 LBS | TEMPERATURE: 98.3 F

## 2018-05-11 DIAGNOSIS — I10 ESSENTIAL HYPERTENSION: ICD-10-CM

## 2018-05-11 DIAGNOSIS — E11.9 TYPE 2 DIABETES MELLITUS WITHOUT COMPLICATION, WITHOUT LONG-TERM CURRENT USE OF INSULIN (HCC): Primary | ICD-10-CM

## 2018-05-11 DIAGNOSIS — E83.52 HYPERCALCEMIA: ICD-10-CM

## 2018-05-11 DIAGNOSIS — E11.8 TYPE 2 DIABETES MELLITUS WITH COMPLICATION, WITHOUT LONG-TERM CURRENT USE OF INSULIN (HCC): ICD-10-CM

## 2018-05-11 DIAGNOSIS — E78.00 PURE HYPERCHOLESTEROLEMIA: ICD-10-CM

## 2018-05-11 RX ORDER — LISINOPRIL 20 MG/1
TABLET ORAL
Qty: 90 TAB | Refills: 3 | Status: SHIPPED | OUTPATIENT
Start: 2018-05-11 | End: 2019-05-14 | Stop reason: SDUPTHER

## 2018-05-11 RX ORDER — LANCETS
EACH MISCELLANEOUS
Qty: 100 EACH | Refills: 3 | Status: SHIPPED | OUTPATIENT
Start: 2018-05-11 | End: 2020-02-10 | Stop reason: SDUPTHER

## 2018-05-11 RX ORDER — METFORMIN HYDROCHLORIDE 500 MG/1
TABLET, EXTENDED RELEASE ORAL
Qty: 360 TAB | Refills: 3 | Status: SHIPPED | OUTPATIENT
Start: 2018-05-11 | End: 2019-04-01 | Stop reason: SDUPTHER

## 2018-05-11 RX ORDER — ROSUVASTATIN CALCIUM 5 MG/1
5 TABLET, COATED ORAL 2 TIMES WEEKLY
Qty: 30 TAB | Refills: 11 | Status: SHIPPED | OUTPATIENT
Start: 2018-05-11 | End: 2018-05-29 | Stop reason: SDUPTHER

## 2018-05-11 NOTE — MR AVS SNAPSHOT
[address] [facility] [phone] Patient: Temi Nayak MRN: [id number] Visit Information Date & Time Provider Department Dept. Phone Encounter #  
 5/11/2018  1:00 PM Boris Chris MD [address] [place] [address] Rios [id number] Follow-up Instructions Return in about 6 months (around 11/11/2018). Follow-up and Disposition History Upcoming Health Maintenance Date Due  
 EYE EXAM RETINAL OR DILATED Q1 3/20/2018 HEMOGLOBIN A1C Q6M 5/6/2018 FOOT EXAM Q1 6/12/2018 MEDICARE YEARLY EXAM 6/13/2018 Influenza Age 5 to Adult 8/1/2018 MICROALBUMIN Q1 11/6/2018 LIPID PANEL Q1 11/6/2018 GLAUCOMA SCREENING Q2Y 3/20/2019 COLONOSCOPY 2/1/2020 DTaP/Tdap/Td series (2 - Td) 6/12/2027 Allergies as of 5/11/2018  Review Complete On: 5/11/2018 By: Robina Lopez LPN Severity Noted Reaction Type Reactions Streptomycin  07/16/2013    Unknown (comments) Current Immunizations  Reviewed on 9/15/2016 Name Date Influenza High Dose Vaccine PF 8/10/2017, 9/8/2016, 10/8/2015 Influenza Vaccine 11/17/2014 Pneumococcal Conjugate (PCV-13) 12/17/2014 Pneumococcal Vaccine (Unspecified Type) 8/4/2009 Td 8/4/2009 Zoster Vaccine, Live 12/11/2015 Not reviewed this visit You Were Diagnosed With   
  
 Codes Comments Type 2 diabetes mellitus without complication, without long-term current use of insulin (HCC)    -  Primary ICD-10-CM: E11.9 ICD-9-CM: 250.00 Pure hypercholesterolemia     ICD-10-CM: E78.00 ICD-9-CM: 272.0 Essential hypertension     ICD-10-CM: I10 
ICD-9-CM: 401.9 Type 2 diabetes mellitus with complication, without long-term current use of insulin (HCC)     ICD-10-CM: E11.8 ICD-9-CM: 250.90 Vitals BP Pulse Temp Resp Height(growth percentile) Weight(growth percentile) 124/70 (BP 1 Location: Left arm, BP Patient Position: Sitting) 99 98.3 °F (36.8 °C) (Oral) 12 5' (1.524 m) 162 lb (73.5 kg) SpO2 BMI OB Status Smoking Status 99% 31.64 kg/m2 Hysterectomy Never Smoker BMI and BSA Data Body Mass Index Body Surface Area  
 31.64 kg/m 2 1.76 m 2 Preferred Pharmacy Pharmacy Name Phone [facility] [address] [facility] [phone] Your Updated Medication List  
  
   
This list is accurate as of 5/11/18  2:33 PM.  Always use your most recent med list.  
  
  
  
  
 ALEVE 220 mg Cap Generic drug:  naproxen sodium Take 1 tablet by mouth daily. allopurinol 300 mg tablet Commonly known as:  ZYLOPRIM  
TAKE ONE TABLET BY MOUTH ONCE DAILY TO  PREVENT  GOUT  
  
 aspirin delayed-release 81 mg tablet Take  by mouth daily. glucose blood VI test strips strip Commonly known as:  ASCENSIA AUTODISC VI, ONE TOUCH ULTRA TEST VI  
Use to check sugar daily dx e11.9, not on insulin. One touch ultra blue  
  
 guaiFENesin-codeine 100-10 mg/5 mL solution Commonly known as:  ROBITUSSIN AC Take 5 mL by mouth three (3) times daily as needed for Cough. Max Daily Amount: 15 mL. Lancets Misc Use to check sugar daily. One touch delica 60ZH  
  
 lisinopril 20 mg tablet Commonly known as:  PRINIVIL, ZESTRIL  
TAKE ONE TABLET BY MOUTH ONCE DAILY for pressure and heart  
  
 magnesium oxide 400 mg tablet Commonly known as:  MAG-OX Take 400 mg by mouth daily. metFORMIN  mg tablet Commonly known as:  GLUCOPHAGE XR  
take 4 tablets by mouth daily for sugar  
  
 multivitamin tablet Commonly known as:  ONE A DAY Take 1 tablet by mouth daily. potassium 99 mg tablet Take 99 mg by mouth daily. rosuvastatin 5 mg tablet Commonly known as:  CRESTOR Take 1 Tab by mouth two (2) times a week. SITagliptin 100 mg tablet Commonly known as:  Yesica Aquino Take 1 Tab by mouth daily. Indications: sugar. VITAMIN C 500 mg tablet Generic drug:  ascorbic acid (vitamin C) Take 500 mg by mouth two (2) times a day. VITAMIN D3 2,000 unit Tab Generic drug:  cholecalciferol (vitamin D3) Take  by mouth. Prescriptions Sent to Pharmacy Refills  
 lisinopril (PRINIVIL, ZESTRIL) 20 mg tablet 3 Sig: TAKE ONE TABLET BY MOUTH ONCE DAILY for pressure and heart Class: Normal  
 [facility]: [facility] [address] Ph #: [phone]  
 metFORMIN ER (GLUCOPHAGE XR) 500 mg tablet 3 Sig: take 4 tablets by mouth daily for sugar Class: Normal  
 Pharmacy: [facility] [address] Ph #: [phone]  
 rosuvastatin (CRESTOR) 5 mg tablet 11 Sig: Take 1 Tab by mouth two (2) times a week. Class: Normal  
 [facility], [address] [place] [address] Ph #: [phone] Route: Oral  
 glucose blood VI test strips (ASCENSIA AUTODISC VI, ONE TOUCH ULTRA TEST VI) strip 3 Sig: Use to check sugar daily dx e11.9, not on insulin. One touch ultra blue Class: Normal  
 Pharmacy: [facility] [address] TAYLA BARBA [address] [place] [address], [facility] Ph #: [phone] Lancets misc 3 Sig: Use to check sugar daily. One touch delica 51YN Class: Normal  
 Pharmacy: [facility] [address] TAYLA BARBA [address] [place] [address], [facility] Ph #: [phone] We Performed the Following HEMOGLOBIN A1C WITH EAG [95192 CPT(R)] LIPID PANEL [32454 CPT(R)] METABOLIC PANEL, BASIC [88310 CPT(R)] Follow-up Instructions Return in about 6 months (around 11/11/2018). Patient Instructions If you have any questions regarding Powered[employer], you may call [employer] support at [phone]. [facility] & HEALTH SERVICES! Dear Harjit Pimentel: Thank you for requesting a [employer] account.   Our records indicate that you already have an active Digistrive account. You can access your account anytime at [email or web address] Did you know that you can access your hospital and ER discharge instructions at any time in Digistrive? You can also review all of your test results from your hospital stay or ER visit. Additional Information If you have questions, please visit the Frequently Asked Questions section of the Digistrive website at [email or web address] Remember, Digistrive is NOT to be used for urgent needs. For medical emergencies, dial 911. Now available from your iPhone and Android! Please provide this summary of care documentation to your next provider. Your primary care clinician is listed as Viky Treviño. If you have any questions after today's visit, please call [phone].

## 2018-05-11 NOTE — PROGRESS NOTES
1. Have you been to the ER, urgent care clinic since your last visit? Hospitalized since your last visit? No    2. Have you seen or consulted any other health care providers outside of the [address] [place] [address] since your last visit? Include any pap smears or colon screening. Yes  Foot doctor, eye doctor      Nicki Castle is a 78 y.o. female presenting for/with:    Diabetes (6 mo fu)    HPI:  Diabetes. Sugars controlled well since last visit. Running mid 100's on on meter. Hypoglycemia: none  Tolerating current treatment well  Current medications include Metformin ER 2000mg every day and januvia 100mg qd. Lab Results   Component Value Date/Time    Hemoglobin A1c 6.7 (H) 11/06/2017 11:51 AM    Hemoglobin A1c 6.9 (H) 06/12/2017 12:46 PM    Hemoglobin A1c 6.6 (H) 09/15/2016 10:15 AM    Glucose 69 11/06/2017 11:51 AM    Microalb/Creat ratio (ug/mg creat.) 25.5 04/04/2016 12:02 PM    Microalbumin/creat ratio (POC) <30 11/06/2017 12:00 PM    LDL, calculated Comment 11/06/2017 11:51 AM    Creatinine 0.81 11/06/2017 11:51 AM     Lab Results   Component Value Date/Time    Microalb/Creat ratio (ug/mg creat.) 25.5 04/04/2016 12:02 PM     Lab Results   Component Value Date/Time    Sodium 142 11/06/2017 11:51 AM    Potassium 4.8 11/06/2017 11:51 AM    Chloride 99 11/06/2017 11:51 AM    CO2 23 11/06/2017 11:51 AM    Glucose 69 11/06/2017 11:51 AM    BUN 16 11/06/2017 11:51 AM    Creatinine 0.81 11/06/2017 11:51 AM    BUN/Creatinine ratio 20 11/06/2017 11:51 AM    GFR est AA 82 11/06/2017 11:51 AM    GFR est non-AA 71 11/06/2017 11:51 AM    Calcium 11.2 (H) 11/06/2017 11:51 AM     Last eye exam performed 5/18, Dr. Ivonne Gibson at [address] Ofc, no DR per pt. Last foot exam 5/2018 with Podiatry in [address] [place] [address] Dr. Eulalia Huitron, no abn. Hyperlipidemia. No problems with headaches since visit. Taking crestor 5mg twice a week now. Daily doses have caused myalgias.   Lab Results   Component Value Date/Time    Cholesterol, total 206 (H) 11/06/2017 11:51 AM    HDL Cholesterol 37 (L) 11/06/2017 11:51 AM    LDL, calculated Comment 11/06/2017 11:51 AM    VLDL, calculated Comment 11/06/2017 11:51 AM    Triglyceride 503 (H) 11/06/2017 11:51 AM     Lab Results   Component Value Date/Time    ALT (SGPT) 44 (H) 06/12/2017 12:46 PM    AST (SGOT) 37 06/12/2017 12:46 PM    Alk. phosphatase 68 06/12/2017 12:46 PM    Bilirubin, total 0.4 06/12/2017 12:46 PM     PMH, SH, Medications/Allergies: reviewed, on chart. ROS:  Constitutional: No fever, chills or weight loss  Respiratory: No cough, SOB   CV: No chest pain or Palpitations    Visit Vitals    /70 (BP 1 Location: Left arm, BP Patient Position: Sitting)    Pulse 99    Temp 98.3 °F (36.8 °C) (Oral)    Resp 12    Ht 5' (1.524 m)    Wt 162 lb (73.5 kg)    SpO2 99%    BMI 31.64 kg/m2     Wt Readings from Last 3 Encounters:   05/11/18 162 lb (73.5 kg)   11/06/17 158 lb (71.7 kg)   06/12/17 163 lb 8 oz (74.2 kg)   +4#  BP Readings from Last 3 Encounters:   05/11/18 124/70   11/06/17 139/54   06/12/17 146/72     Physical Examination: General appearance - alert, well appearing, and in no distress  Mental status - alert, oriented to person, place, and time  Eyes - pupils equal and reactive, extraocular eye movements intact  ENT - bilateral external ears and nose normal. Normal lips  Neck - supple, no significant adenopathy, no thyromegaly or mass  Lymphatics - no palpable lymphadenopathy, no hepatosplenomegaly  Chest - clear to auscultation, no wheezes, rales or rhonchi, symmetric air entry. Heart - normal rate, regular rhythm, normal S1, S2, no murmurs, rubs, clicks or gallops  Extremities - peripheral pulses normal, no pedal edema, no clubbing or cyanosis. A/P:  DM2  doing well on metformin and Januvia. Check A1c/BMP/varsha/cr. If not in goal, consider change januvia to bydureon. Lipids  Norman the crestor 5mg BIW, so/so lipids on last check. Recheck today.  Consider boost to TIW if not in goal.    HTN  Ok today. con't plain lisinopril 20mg every day. F/U 5-6mo for routine visit/prn.

## 2018-05-12 LAB
BUN SERPL-MCNC: 29 MG/DL (ref 8–27)
BUN/CREAT SERPL: 27 (ref 12–28)
CALCIUM SERPL-MCNC: 12.1 MG/DL (ref 8.7–10.3)
CHLORIDE SERPL-SCNC: 97 MMOL/L (ref 96–106)
CHOLEST SERPL-MCNC: 225 MG/DL (ref 100–199)
CO2 SERPL-SCNC: 23 MMOL/L (ref 18–29)
CREAT SERPL-MCNC: 1.06 MG/DL (ref 0.57–1)
EST. AVERAGE GLUCOSE BLD GHB EST-MCNC: 160 MG/DL
GFR SERPLBLD CREATININE-BSD FMLA CKD-EPI: 51 ML/MIN/1.73
GFR SERPLBLD CREATININE-BSD FMLA CKD-EPI: 59 ML/MIN/1.73
GLUCOSE SERPL-MCNC: 101 MG/DL (ref 65–99)
HBA1C MFR BLD: 7.2 % (ref 4.8–5.6)
HDLC SERPL-MCNC: 37 MG/DL
LDLC SERPL CALC-MCNC: ABNORMAL MG/DL (ref 0–99)
POTASSIUM SERPL-SCNC: 4.8 MMOL/L (ref 3.5–5.2)
SODIUM SERPL-SCNC: 139 MMOL/L (ref 134–144)
TRIGL SERPL-MCNC: 558 MG/DL (ref 0–149)
VLDLC SERPL CALC-MCNC: ABNORMAL MG/DL (ref 5–40)

## 2018-05-14 PROBLEM — E11.21 TYPE 2 DIABETES WITH NEPHROPATHY (HCC): Status: ACTIVE | Noted: 2018-05-14

## 2018-05-14 NOTE — PROGRESS NOTES
Sugar ok. Cholesterol about the same. Kidneys weaker, and calcium higher, significant now. Adding parathyroid hormone assay. May need redraw. 5/15/2018 1:27 PM Addendum: Pt came back for redraw, wants to work harder on TLC's for now. Will renew [facility] [place].     Jeff Sol MD

## 2018-05-15 ENCOUNTER — LAB ONLY (OUTPATIENT)
Dept: FAMILY MEDICINE CLINIC | Age: 78
End: 2018-05-15

## 2018-05-15 NOTE — MR AVS SNAPSHOT
[facility] 
 
 
 1000 [facility] [address] [facility],5[address] [address] [phone] Patient: Gerald Walters MRN: [id number] PMQ:5/73/9121 Visit Information Date & Time Provider Department Dept. Phone Encounter #  
 5/15/2018  1:20 PM Khalida Farrell [id number] Your Appointments 8/17/2018  1:00 PM  
Medicare Physical with MD Gia Albarado 38 [facility] Appt Note: Annual Wellness visit [address] [address] [phone]  
  
   
 [address] [address] Upcoming Health Maintenance Date Due  
 EYE EXAM RETINAL OR DILATED Q1 3/20/2018 MEDICARE YEARLY EXAM 6/13/2018 Influenza Age 5 to Adult 8/1/2018 MICROALBUMIN Q1 11/6/2018 HEMOGLOBIN A1C Q6M 11/11/2018 GLAUCOMA SCREENING Q2Y 3/20/2019 FOOT EXAM Q1 5/3/2019 LIPID PANEL Q1 5/11/2019 COLONOSCOPY 2/1/2020 DTaP/Tdap/Td series (2 - Td) 6/12/2027 Allergies as of 5/15/2018  Review Complete On: 5/11/2018 By: Moris Chand LPN Severity Noted Reaction Type Reactions Streptomycin  07/16/2013    Unknown (comments) Current Immunizations  Reviewed on 9/15/2016 Name Date Influenza High Dose Vaccine PF 8/10/2017, 9/8/2016, 10/8/2015 Influenza Vaccine 11/17/2014 Pneumococcal Conjugate (PCV-13) 12/17/2014 Pneumococcal Vaccine (Unspecified Type) 8/4/2009 Td 8/4/2009 Zoster Vaccine, Live 12/11/2015 Not reviewed this visit Vitals OB Status Smoking Status Hysterectomy Never Smoker Preferred Pharmacy Pharmacy Name Phone [facility], [facility] [phone] Your Updated Medication List  
  
   
This list is accurate as of 5/15/18  2:56 PM.  Always use your most recent med list.  
  
  
  
  
 ALEVE 220 mg Cap Generic drug:  naproxen sodium Take 1 tablet by mouth daily. allopurinol 300 mg tablet Commonly known as:  ZYLOPRIM  
TAKE ONE TABLET BY MOUTH ONCE DAILY TO  PREVENT  GOUT  
  
 aspirin delayed-release 81 mg tablet Take  by mouth daily. glucose blood VI test strips strip Commonly known as:  ASCENSIA AUTODISC VI, ONE TOUCH ULTRA TEST VI  
Use to check sugar daily dx e11.9, not on insulin. One touch ultra blue  
  
 guaiFENesin-codeine 100-10 mg/5 mL solution Commonly known as:  ROBITUSSIN AC Take 5 mL by mouth three (3) times daily as needed for Cough. Max Daily Amount: 15 mL. Lancets Misc Use to check sugar daily. One touch delica 41GQ  
  
 lisinopril 20 mg tablet Commonly known as:  PRINIVIL, ZESTRIL  
TAKE ONE TABLET BY MOUTH ONCE DAILY for pressure and heart  
  
 magnesium oxide 400 mg tablet Commonly known as:  MAG-OX Take 400 mg by mouth daily. metFORMIN  mg tablet Commonly known as:  GLUCOPHAGE XR  
take 4 tablets by mouth daily for sugar  
  
 multivitamin tablet Commonly known as:  ONE A DAY Take 1 tablet by mouth daily. potassium 99 mg tablet Take 99 mg by mouth daily. rosuvastatin 5 mg tablet Commonly known as:  CRESTOR Take 1 Tab by mouth two (2) times a week. SITagliptin 100 mg tablet Commonly known as:  Noreene Sly Take 1 Tab by mouth daily. Indications: sugar. VITAMIN C 500 mg tablet Generic drug:  ascorbic acid (vitamin C) Take 500 mg by mouth two (2) times a day. VITAMIN D3 2,000 unit Tab Generic drug:  cholecalciferol (vitamin D3) Take  by mouth. Patient Instructions If you have any questions regarding Tapatalk, you may call Tapatalk support at [phone]. Introducing [facility]! Dear Simran: Thank you for requesting a [employer] account. Our records indicate that you already have an active [employer]ida account. You can access your account anytime at [email or web address] Did you know that you can access your hospital and ER discharge instructions at any time in [employer]? You can also review all of your test results from your hospital stay or ER visit. Additional Information If you have questions, please visit the Frequently Asked Questions section of the [employer] website at [email or web address] Remember, [employer] is NOT to be used for urgent needs. For medical emergencies, dial 911. Now available from your iPhone and Android! Please provide this summary of care documentation to your next provider. Your primary care clinician is listed as Esteban Treviño. If you have any questions after today's visit, please call [phone].

## 2018-05-16 LAB — PTH-INTACT SERPL-MCNC: 31 PG/ML (ref 15–65)

## 2018-05-16 NOTE — PROGRESS NOTES
Parathyroid hormone ok. Not sure why calcium still so high. Needs to be investigated. I think you need to see a kidney specialist (they're the ones who handle calcium issues usually). I can set you up with Dr. Spring Sevilla in [place] if that's ok with you. I also need an plane film xray of the pelvis and chest as part of this investigation, that's available on a walk-in basis at [facility].

## 2018-05-26 ENCOUNTER — PATIENT MESSAGE (OUTPATIENT)
Dept: FAMILY MEDICINE CLINIC | Age: 78
End: 2018-05-26

## 2018-05-26 DIAGNOSIS — E78.00 PURE HYPERCHOLESTEROLEMIA: ICD-10-CM

## 2018-05-29 RX ORDER — ROSUVASTATIN CALCIUM 5 MG/1
5 TABLET, COATED ORAL 3 TIMES WEEKLY
Qty: 36 TAB | Refills: 3 | Status: SHIPPED | OUTPATIENT
Start: 2018-05-30 | End: 2019-01-28 | Stop reason: SDUPTHER

## 2018-05-29 NOTE — TELEPHONE ENCOUNTER
From: Jesus Crowell  To: Berry Bush MD  Sent: 5/26/2018 9:08 AM EDT  Subject: Prescription Question    Prescription rosuvastatin calcium. You wrote this prescription take one tablet two times a week thus the pharmacy gave me only 25 pills for a 3 month supply. However you asked me to start taking one tablet three times a week and there not enough pills to do this for a three month period. Is there any way to change this prescription since I am on a drug plan that only allows renewals every 3 months. The current date filled was 5/11/2018.

## 2018-06-14 ENCOUNTER — TELEPHONE (OUTPATIENT)
Dept: FAMILY MEDICINE CLINIC | Age: 78
End: 2018-06-14

## 2018-06-14 NOTE — TELEPHONE ENCOUNTER
----- Message from Ingris Yang sent at 6/14/2018  1:27 PM EDT -----  Regarding: Dr Ilan Solis  Pt is following up on Chest Xrays that were ordered, pt has checked with the hospital there is nothing on record.     Best contact Jesus Rg

## 2018-06-19 ENCOUNTER — TELEPHONE (OUTPATIENT)
Dept: FAMILY MEDICINE CLINIC | Age: 78
End: 2018-06-19

## 2018-06-19 NOTE — TELEPHONE ENCOUNTER
----- Message from Srinivas Barraza sent at 6/19/2018  2:00 PM EDT -----  Regarding: Dr Jody Scanlon is returning [facility] call, please call pt back at [phone].

## 2018-08-20 ENCOUNTER — OFFICE VISIT (OUTPATIENT)
Dept: FAMILY MEDICINE CLINIC | Age: 78
End: 2018-08-20

## 2018-08-20 VITALS
OXYGEN SATURATION: 95 % | BODY MASS INDEX: 30.04 KG/M2 | DIASTOLIC BLOOD PRESSURE: 60 MMHG | WEIGHT: 153 LBS | TEMPERATURE: 97.8 F | HEART RATE: 84 BPM | SYSTOLIC BLOOD PRESSURE: 130 MMHG | HEIGHT: 60 IN

## 2018-08-20 DIAGNOSIS — E11.8 TYPE 2 DIABETES MELLITUS WITH COMPLICATION, WITHOUT LONG-TERM CURRENT USE OF INSULIN (HCC): ICD-10-CM

## 2018-08-20 DIAGNOSIS — Z13.31 SCREENING FOR DEPRESSION: ICD-10-CM

## 2018-08-20 DIAGNOSIS — E83.52 HYPERCALCEMIA: ICD-10-CM

## 2018-08-20 DIAGNOSIS — E21.0 PRIMARY HYPERPARATHYROIDISM (HCC): ICD-10-CM

## 2018-08-20 DIAGNOSIS — Z13.39 SCREENING FOR ALCOHOLISM: ICD-10-CM

## 2018-08-20 DIAGNOSIS — Z00.00 MEDICARE ANNUAL WELLNESS VISIT, SUBSEQUENT: Primary | ICD-10-CM

## 2018-08-20 NOTE — MR AVS SNAPSHOT
[facility] 
 
 
 1000 [place] [id number] [facility],[address] [address] [phone] Patient: Thaddeus Silvestre MRN: [id number] QTC:[birth year] Visit Information Date & Time Provider Department Dept. Phone Encounter #  
 8/20/2018  2:00 PM Gia Bergeron [phone] [id number] Follow-up Instructions Return in about 3 months (around 11/20/2018). Follow-up and Disposition History Your Appointments 11/12/2018 11:30 AM  
ESTABLISHED PATIENT with MD Gia Bergeron 38 ([facility]) Appt Note: 3mo fu  
 [address] [address] [phone]  
  
   
 [address] [address] Upcoming Health Maintenance Date Due  
 EYE EXAM RETINAL OR DILATED Q1 3/20/2018 MEDICARE YEARLY EXAM 6/13/2018 Influenza Age 5 to Adult 8/1/2018 MICROALBUMIN Q1 11/6/2018 HEMOGLOBIN A1C Q6M 11/11/2018 GLAUCOMA SCREENING Q2Y 3/20/2019 FOOT EXAM Q1 5/3/2019 LIPID PANEL Q1 5/11/2019 COLONOSCOPY 2/1/2020 DTaP/Tdap/Td series (2 - Td) 6/12/2027 Allergies as of 8/20/2018  Review Complete On: 8/20/2018 By: Elfego Blanco MD  
  
 Severity Noted Reaction Type Reactions Streptomycin  07/16/2013    Unknown (comments) Current Immunizations  Reviewed on 9/15/2016 Name Date Influenza High Dose Vaccine PF 8/10/2017, 9/8/2016, 10/8/2015 Influenza Vaccine 11/17/2014 Pneumococcal Conjugate (PCV-13) 12/17/2014 Pneumococcal Vaccine (Unspecified Type) 8/4/2009 Td 8/4/2009 Zoster Vaccine, Live 12/11/2015 Not reviewed this visit You Were Diagnosed With   
  
 Codes Comments Medicare annual wellness visit, subsequent    -  Primary ICD-10-CM: Z00.00 ICD-9-CM: V70.0 Screening for alcoholism     ICD-10-CM: Z13.89 ICD-9-CM: V79.1 Screening for depression     ICD-10-CM: Z13.89 ICD-9-CM: V79.0 Hypercalcemia     ICD-10-CM: J08.79 
ICD-9-CM: 275.42 Primary hyperparathyroidism ([facility] [address]     ICD-10-CM: E21.0 ICD-9-CM: 252.01 Type 2 diabetes mellitus with complication, without long-term current use of insulin (HCC)     ICD-10-CM: E11.8 ICD-9-CM: 250.90 Vitals BP Pulse Temp Height(growth percentile) Weight(growth percentile) 130/60 (BP 1 Location: Left arm, BP Patient Position: Sitting) 84 97.8 °F (36.6 °C) (Temporal) 5' (1.524 m) 153 lb (69.4 kg) SpO2 BMI OB Status Smoking Status 95% 29.88 kg/m2 Hysterectomy Never Smoker BMI and BSA Data Body Mass Index Body Surface Area  
 29.88 kg/m 2 1.71 m 2 Preferred Pharmacy Pharmacy Name Phone [facility], [address] [place] [address] [phone] Your Updated Medication List  
  
   
This list is accurate as of 8/20/18  3:33 PM.  Always use your most recent med list.  
  
  
  
  
 ALEVE 220 mg Cap Generic drug:  naproxen sodium Take 2 Tabs by mouth daily. allopurinol 300 mg tablet Commonly known as:  ZYLOPRIM  
TAKE ONE TABLET BY MOUTH ONCE DAILY TO  PREVENT  GOUT  
  
 aspirin delayed-release 81 mg tablet Take  by mouth daily. glucose blood VI test strips strip Commonly known as:  ASCENSIA AUTODISC VI, ONE TOUCH ULTRA TEST VI  
Use to check sugar daily dx e11.9, not on insulin. One touch ultra blue Lancets Misc Use to check sugar daily. One touch delica 62AV  
  
 lisinopril 20 mg tablet Commonly known as:  PRINIVIL, ZESTRIL  
TAKE ONE TABLET BY MOUTH ONCE DAILY for pressure and heart  
  
 magnesium oxide 400 mg tablet Commonly known as:  MAG-OX Take 400 mg by mouth daily. metFORMIN  mg tablet Commonly known as:  GLUCOPHAGE XR  
take 4 tablets by mouth daily for sugar  
  
 multivitamin tablet Commonly known as:  ONE A DAY Take 1 tablet by mouth daily. potassium 99 mg tablet Take 99 mg by mouth daily. rosuvastatin 5 mg tablet Commonly known as:  CRESTOR Take 1 Tab by mouth Three (3) times a week. Indications: cholesterol and heart SITagliptin 100 mg tablet Commonly known as:  Lisa [place] Take 1 Tab by mouth daily. Indications: sugar. VITAMIN C 500 mg tablet Generic drug:  ascorbic acid (vitamin C) Take 500 mg by mouth two (2) times a day. VITAMIN D3 2,000 unit Tab Generic drug:  cholecalciferol (vitamin D3) Take  by mouth. We Performed the Following COLLECTION VENOUS BLOOD,VENIPUNCTURE [id number] CPT(R)] [facility] [[id number] [facility]] HEMOGLOBIN A1C WITH EAG [85737 CPT(R)] METABOLIC PANEL, BASIC [55555 CPT(R)] NC ANNUAL ALCOHOL SCREEN 15 MIN [id number] [facility]] REFERRAL TO ENDOCRINOLOGY [EHD94 Custom] Comments:  
 Persistent significan hypercalcemia 11-12 range, suspect primary hyperparathyroidism. Please evaluate and treat as indicated. Follow-up Instructions Return in about 3 months (around 11/20/2018). Referral Information Referral ID Referred By Referred To  
  
 [id number] Mendel Curiel, [address] Kamlesh Acosta MD   
   [address] [place] [address] 2 [address] Phone: [phone] Visits Status Start Date End Date 1 New Request 8/20/18 8/20/19 If your referral has a status of pending review or denied, additional information will be sent to support the outcome of this decision. Patient Instructions Medicare Wellness Visit, Female The best way to live healthy is to have a lifestyle where you eat a well-balanced diet, exercise regularly, limit alcohol use, and quit all forms of tobacco/nicotine, if applicable. Regular preventive services are another way to keep healthy. Preventive services (vaccines, screening tests, monitoring & exams) can help personalize your care plan, which helps you manage your own care. Screening tests can find health problems at the earliest stages, when they are easiest to treat. [facility] follows the current, evidence-based guidelines published by the [facility] Jesus Brown (USPSTF) when recommending preventive services for our patients. Because we follow these guidelines, sometimes recommendations change over time as research supports it. (For example, mammograms used to be recommended annually. Even though Medicare will still pay for an annual mammogram, the newer guidelines recommend a mammogram every two years for women of average risk.) Of course, you and your doctor may decide to screen more often for some diseases, based on your risk and your health status. Preventive services for you include: - Medicare offers their members a free annual wellness visit, which is time for you and your primary care provider to discuss and plan for your preventive service needs. Take advantage of this benefit every year! 
-All adults over the age of 72 should receive the recommended pneumonia vaccines. Current USPSTF guidelines recommend a series of two vaccines for the best pneumonia protection.  
-All adults should have a flu vaccine yearly and a tetanus vaccine every 10 years. All adults age 61 and older should receive a shingles vaccine once in their lifetime.   
-A bone mass density test is recommended when a woman turns 65 to screen for osteoporosis. This test is only recommended one time, as a screening. Some providers will use this same test as a disease monitoring tool if you already have osteoporosis. -All adults age 38-68 who are overweight should have a diabetes screening test once every three years.  
-Other screening tests and preventive services for persons with diabetes include: an eye exam to screen for diabetic retinopathy, a kidney function test, a foot exam, and stricter control over your cholesterol. -Cardiovascular screening for adults with routine risk involves an electrocardiogram (ECG) at intervals determined by your doctor.  
-Colorectal cancer screenings should be done for adults age 54-65 with no increased risk factors for colorectal cancer. There are a number of acceptable methods of screening for this type of cancer. Each test has its own benefits and drawbacks. Discuss with your doctor what is most appropriate for you during your annual wellness visit. The different tests include: colonoscopy (considered the best screening method), a fecal occult blood test, a fecal DNA test, and sigmoidoscopy. -Breast cancer screenings are recommended every other year for women of normal risk, age 54-69. 
-Cervical cancer screenings for women over age 72 are only recommended with certain risk factors.  
-All adults born between [facility] should be screened once for Hepatitis C. Here is a list of your current Health Maintenance items (your personalized list of preventive services) with a due date: 
Health Maintenance Due Topic Date Due [facility] Eye Exam  03/20/2018 [facility] Annual Well Visit  06/13/2018  Flu Vaccine  08/01/2018 Patient Instructions History Introducing [facility]! Dear Anise Crigler: Thank you for requesting a [employer] account. Our records indicate that you already have an active [employer] account. You can access your account anytime at [email or web address] Did you know that you can access your hospital and ER discharge instructions at any time in [employer]? You can also review all of your test results from your hospital stay or ER visit. Additional Information If you have questions, please visit the Frequently Asked Questions section of the [employer] website at [email or web address] Remember, [employer] is NOT to be used for urgent needs. For medical emergencies, dial 911. Now available from your iPhone and Android! Please provide this summary of care documentation to your next provider. Your primary care clinician is listed as Kinsey Treviño. If you have any questions after today's visit, please call [phone].

## 2018-08-20 NOTE — PROGRESS NOTES
Mauricio Tim is a 70 y.o. female presenting for/with: Annual Wellness Visit    HPI:  Diabetes. Sugars controlled well since last visit. Running mid 100's on on meter. Hypoglycemia: none  Tolerating current treatment well  Current medications include Metformin ER 2000mg every day and januvia 100mg qd. Lab Results   Component Value Date/Time    Hemoglobin A1c 7.2 (H) 05/11/2018 02:18 PM    Hemoglobin A1c 6.7 (H) 11/06/2017 11:51 AM    Hemoglobin A1c 6.9 (H) 06/12/2017 12:46 PM    Glucose 101 (H) 05/11/2018 02:18 PM    Microalb/Creat ratio (ug/mg creat.) 25.5 04/04/2016 12:02 PM    Microalbumin/creat ratio (POC) <30 11/06/2017 12:00 PM    LDL, calculated Comment 05/11/2018 02:18 PM    Creatinine 1.06 (H) 05/11/2018 02:18 PM     Lab Results   Component Value Date/Time    Sodium 139 05/11/2018 02:18 PM    Potassium 4.8 05/11/2018 02:18 PM    Chloride 97 05/11/2018 02:18 PM    CO2 23 05/11/2018 02:18 PM    Glucose 101 (H) 05/11/2018 02:18 PM    BUN 29 (H) 05/11/2018 02:18 PM    Creatinine 1.06 (H) 05/11/2018 02:18 PM    BUN/Creatinine ratio 27 05/11/2018 02:18 PM    GFR est AA 59 (L) 05/11/2018 02:18 PM    GFR est non-AA 51 (L) 05/11/2018 02:18 PM    Calcium 12.1 (H) 05/11/2018 02:18 PM     Last eye exam performed 5/18, Dr. Farzana Olivares at [address] [place] [address]. Ofc, no DR per pt. Last foot exam 5/2018 with Podiatry in [address] [place] [address] Dr. Marylin Mancini, no abn. Hyperlipidemia. No problems with headaches since visit. Taking crestor 5mg 3x/ week now. Daily doses have caused myalgias. Lab Results   Component Value Date/Time    Cholesterol, total 225 (H) 05/11/2018 02:18 PM    HDL Cholesterol 37 (L) 05/11/2018 02:18 PM    LDL, calculated Comment 05/11/2018 02:18 PM    VLDL, calculated Comment 05/11/2018 02:18 PM    Triglyceride 558 (HH) 05/11/2018 02:18 PM     Lab Results   Component Value Date/Time    ALT (SGPT) 44 (H) 06/12/2017 12:46 PM    AST (SGOT) 37 06/12/2017 12:46 PM    Alk.  phosphatase 68 06/12/2017 12:46 PM    Bilirubin, total 0.4 06/12/2017 12:46 PM     PMH, SH, Medications/Allergies: reviewed, on chart. ROS:  Constitutional: No fever, chills or weight loss  Respiratory: No cough, SOB   CV: No chest pain or Palpitations    Visit Vitals    /60 (BP 1 Location: Left arm, BP Patient Position: Sitting)    Pulse 84    Temp 97.8 °F (36.6 °C) (Temporal)    Ht 5' (1.524 m)    Wt 153 lb (69.4 kg)    SpO2 95%    BMI 29.88 kg/m2     Wt Readings from Last 3 Encounters:   08/20/18 153 lb (69.4 kg)   05/11/18 162 lb (73.5 kg)   11/06/17 158 lb (71.7 kg)   -9#  BP Readings from Last 3 Encounters:   08/20/18 130/60   05/11/18 124/70   11/06/17 139/54     Physical Examination: General appearance - alert, well appearing, and in no distress  Mental status - alert, oriented to person, place, and time  Eyes - pupils equal and reactive, extraocular eye movements intact  ENT - bilateral external ears and nose normal. Normal lips  Neck - supple, no significant adenopathy, no thyromegaly or mass  Lymphatics - no palpable lymphadenopathy, no hepatosplenomegaly  Chest - clear to auscultation, no wheezes, rales or rhonchi, symmetric air entry. Heart - normal rate, regular rhythm, normal S1, S2, no murmurs, rubs, clicks or gallops  Extremities - peripheral pulses normal, no pedal edema, no clubbing or cyanosis. A/P:  DM2  doing well on metformin and Januvia. Check A1c/BMP. If not in goal, consider change januvia to bydureon. Lipids  Norman the crestor 5mg TIW ok. Recheck lipids. HTN  Ok today. con't plain lisinopril 20mg every day. Hypercalcemia  Likely primary hyperPTH. Will send to Endocrine to have eval.  Lab Results   Component Value Date/Time    Calcium 12.1 (H) 05/11/2018 02:18 PM     F/U 5-6mo for routine visit/prn.    ______________________________________________________________________  1. Have you been to the ER, urgent care clinic since your last visit? Hospitalized since your last visit? Yes When: [facility] ER     2.  Have you seen or consulted any other health care providers outside of the [address] [place] [address] since your last visit? Include any pap smears or colon screening. No     ______________________________________________________________________    Mark Dillard is a 70 y.o. female and presents for annual Medicare Wellness Visit. Problem List: Reviewed with patient and discussed risk factors. Patient Active Problem List   Diagnosis Code    S/P TKR (total knee replacement) Z96.659    Hyperlipidemia E78.5    OP (osteoporosis) M81.0    Hypercalcemia E83.52    CTS (carpal tunnel syndrome) G56.00    Gout M10.9    DM2 (diabetes mellitus, type 2) (HCC) E11.9    HTN (hypertension) I10    History of colonoscopy Z98.890    Advance directive discussed with patient Z71.89    Type 2 diabetes with nephropathy ([facility] [address] E11.21       Current medical providers:  Patient Care Team:  Hakeem Grande MD as PCP - General (Family Practice)    [facility], , Medications/Allergies: reviewed, on chart. Female Alcohol Screening: On any occasion during the past 3 months, have you had more than 3 drinks containing alcohol? No    Do you average more than 7 drinks per week? No    ROS:  Constitutional: No fever, chills or weight loss  Respiratory: No cough, SOB  CV: No chest pain or Palpitations    Objective:  Visit Vitals    /60 (BP 1 Location: Left arm, BP Patient Position: Sitting)    Pulse 84    Temp 97.8 °F (36.6 °C) (Temporal)    Ht 5' (1.524 m)    Wt 153 lb (69.4 kg)    SpO2 95%    BMI 29.88 kg/m2    Body mass index is 29.88 kg/(m^2). Assessment of cognitive impairment: Alert and oriented x 3    Depression Screen:   PHQ over the last two weeks 8/20/2018   PHQ Not Done -   Little interest or pleasure in doing things Not at all   Feeling down, depressed, irritable, or hopeless Not at all   Total Score PHQ 2 0     Fall Risk Assessment:    Fall Risk Assessment, last 12 mths 8/20/2018   Able to walk?  Yes   Fall in past 12 months? Yes   Fall with injury? No   Number of falls in past 12 months 1   Fall Risk Score 1     Functional Ability:   Does the patient exhibit a steady gait? yes   How long did it take the patient to get up and walk from a sitting position? 3 sec   Is the patient self reliant?  (ie can do own laundry, meals, household chores)  yes     Does the patient handle his/her own medications? yes     Does the patient handle his/her own money? yes     Is the patients home safe (ie good lighting, handrails on stairs and bath, etc.)? yes     Did you notice or did patient express any hearing difficulties? no     Did you notice or did patient express any vision difficulties? yes       Advance Care Planning:   Patient was offered the opportunity to discuss advance care planning:  yes     Does patient have an Advance Directive:  yes   If no, did you provide information on Caring Connections? yes     Plan:      Orders Placed This Encounter    Depression Screen Annual    Annual  Alcohol Screen 15 min ()     Health Maintenance   Topic Date Due    EYE EXAM RETINAL OR DILATED Q1  03/20/2018    MEDICARE YEARLY EXAM  06/13/2018    Influenza Age 5 to Adult  08/01/2018    MICROALBUMIN Q1  11/06/2018    HEMOGLOBIN A1C Q6M  11/11/2018    GLAUCOMA SCREENING Q2Y  03/20/2019    FOOT EXAM Q1  05/03/2019    LIPID PANEL Q1  05/11/2019    COLONOSCOPY  02/01/2020    DTaP/Tdap/Td series (2 - Td) 06/12/2027    Bone Densitometry (Dexa) Screening  Completed    ZOSTER VACCINE AGE 60>  Completed    Pneumococcal 65+ Low/Medium Risk  Completed     *Patient verbalized understanding and agreement with the plan. A copy of the After Visit Summary with personalized health plan was given to the patient today.

## 2018-08-20 NOTE — PATIENT INSTRUCTIONS
Medicare Wellness Visit, Female     The best way to live healthy is to have a lifestyle where you eat a well-balanced diet, exercise regularly, limit alcohol use, and quit all forms of tobacco/nicotine, if applicable. Regular preventive services are another way to keep healthy. Preventive services (vaccines, screening tests, monitoring & exams) can help personalize your care plan, which helps you manage your own care. Screening tests can find health problems at the earliest stages, when they are easiest to treat. [facility] follows the current, evidence-based guidelines published by the [facility] ([facility]) when recommending preventive services for our patients. Because we follow these guidelines, sometimes recommendations change over time as research supports it. (For example, mammograms used to be recommended annually. Even though Medicare will still pay for an annual mammogram, the newer guidelines recommend a mammogram every two years for women of average risk.)  Of course, you and your doctor may decide to screen more often for some diseases, based on your risk and your health status. Preventive services for you include:  - Medicare offers their members a free annual wellness visit, which is time for you and your primary care provider to discuss and plan for your preventive service needs. Take advantage of this benefit every year!  -All adults over the age of 72 should receive the recommended pneumonia vaccines. Current USPSTF guidelines recommend a series of two vaccines for the best pneumonia protection.   -All adults should have a flu vaccine yearly and a tetanus vaccine every 10 years. All adults age 61 and older should receive a shingles vaccine once in their lifetime.    -A bone mass density test is recommended when a woman turns 65 to screen for osteoporosis. This test is only recommended one time, as a screening.  Some providers will use this same test as a disease monitoring tool if you already have osteoporosis. -All adults age 38-68 who are overweight should have a diabetes screening test once every three years.   -Other screening tests and preventive services for persons with diabetes include: an eye exam to screen for diabetic retinopathy, a kidney function test, a foot exam, and stricter control over your cholesterol.   -Cardiovascular screening for adults with routine risk involves an electrocardiogram (ECG) at intervals determined by your doctor.   -Colorectal cancer screenings should be done for adults age 54-65 with no increased risk factors for colorectal cancer. There are a number of acceptable methods of screening for this type of cancer. Each test has its own benefits and drawbacks. Discuss with your doctor what is most appropriate for you during your annual wellness visit. The different tests include: colonoscopy (considered the best screening method), a fecal occult blood test, a fecal DNA test, and sigmoidoscopy. -Breast cancer screenings are recommended every other year for women of normal risk, age 54-69.  -Cervical cancer screenings for women over age 72 are only recommended with certain risk factors.   -All adults born between [facility] should be screened once for Hepatitis C.      Here is a list of your current Health Maintenance items (your personalized list of preventive services) with a due date:  Health Maintenance Due   Topic Date Due    Eye Exam  03/20/2018    Annual Well Visit  06/13/2018    Flu Vaccine  08/01/2018

## 2018-08-21 LAB
BUN SERPL-MCNC: 20 MG/DL (ref 8–27)
BUN/CREAT SERPL: 24 (ref 12–28)
CALCIUM SERPL-MCNC: 11.4 MG/DL (ref 8.7–10.3)
CHLORIDE SERPL-SCNC: 102 MMOL/L (ref 96–106)
CO2 SERPL-SCNC: 22 MMOL/L (ref 20–29)
CREAT SERPL-MCNC: 0.85 MG/DL (ref 0.57–1)
EST. AVERAGE GLUCOSE BLD GHB EST-MCNC: 128 MG/DL
GLUCOSE SERPL-MCNC: 96 MG/DL (ref 65–99)
HBA1C MFR BLD: 6.1 % (ref 4.8–5.6)
POTASSIUM SERPL-SCNC: 5.2 MMOL/L (ref 3.5–5.2)
SODIUM SERPL-SCNC: 144 MMOL/L (ref 134–144)

## 2018-11-09 ENCOUNTER — OFFICE VISIT (OUTPATIENT)
Dept: ENDOCRINOLOGY | Age: 78
End: 2018-11-09

## 2018-11-09 VITALS
HEART RATE: 87 BPM | BODY MASS INDEX: 29.57 KG/M2 | WEIGHT: 150.6 LBS | DIASTOLIC BLOOD PRESSURE: 57 MMHG | SYSTOLIC BLOOD PRESSURE: 128 MMHG | HEIGHT: 60 IN

## 2018-11-09 DIAGNOSIS — E21.3 HYPERPARATHYROIDISM (HCC): ICD-10-CM

## 2018-11-09 DIAGNOSIS — E83.52 HYPERCALCEMIA: Primary | ICD-10-CM

## 2018-11-09 RX ORDER — LANOLIN ALCOHOL/MO/W.PET/CERES
500 CREAM (GRAM) TOPICAL DAILY
COMMUNITY

## 2018-11-09 NOTE — LETTER
11/9/2018 2:18 PM 
 
Patient:  Karmen Almonte YOB: 1940 Date of Visit: 11/9/2018 Dear Alannah Aviles MD 
[address] [facility],[address] [address] VIA In Basket 
 : Thank you for referring Ms. Hammad Morgan to me for evaluation/treatment. Below are the relevant portions of my assessment and plan of care. If you have questions, please do not hesitate to call me. I look forward to following Ms. Ochoa along with you. Sincerely, John Jang MD

## 2018-11-09 NOTE — PROGRESS NOTES
Chief Complaint   Patient presents with    Elevated Blood Calcium     pcp and pharmacy verified   Records reviewed  History of Present Illness: Esther Foley is a 70 y.o. female who I was asked to see in consult by Dr. Rita Anderson for evaluation of hypercalcemia. Looking back through her records pt's Ca levels have ranged between 11.3-12.3 since . She has had two PTH levels drawn during this period and they were 37 and 31. In 2016 pt had a DXA scan which was normal (no osteopenia, no osteoporosis). Pt notes her Ca was first found to be elevated when she was diagnosed with DM in  or  \"before then I had never had calcium problems\". They started by decreased her MVI to lower her Ca intake, and all her medications that had Ca in them were changed. She had been on HCTZ, but Dr. Hermelinda Ge stopped this in . Pt has no hx of renal stones, her father and both of her brothers had kidney stones however. She denies any issues of bone fractures, she has had bilateral TKR and she has hx of OA. She denies issues of AMS, confusion or mood swings. She denies issues of hematuria or dysuria. She still takes a MVI 3 days per week, she does eat a lot of cheese, but does not drink milk regularly. She takes a Vitamin D supplement \"once in a while\". Pt has no hx of cancer, her father did have colon cancer, her MGF  from an unknown cancer. No known family hx of elevated calcium levels. Past Medical History:   Diagnosis Date    Diabetes ([facility] [address]     Endometriosis     Hypercholesterolemia     Hypertension     Menopause     Osteoporosis     Urethral stricture      Past Surgical History:   Procedure Laterality Date    HX HYSTERECTOMY      HX KNEE REPLACEMENT      right and left    HX WISDOM TEETH EXTRACTION       Current Outpatient Medications   Medication Sig    cyanocobalamin (VITAMIN B-12) 500 mcg tablet Take 500 mcg by mouth daily.     rosuvastatin (CRESTOR) 5 mg tablet Take 1 Tab by mouth Three (3) times a week. Indications: cholesterol and heart    SITagliptin (JANUVIA) 100 mg tablet Take 1 Tab by mouth daily. Indications: sugar.  lisinopril (PRINIVIL, ZESTRIL) 20 mg tablet TAKE ONE TABLET BY MOUTH ONCE DAILY for pressure and heart    metFORMIN ER (GLUCOPHAGE XR) 500 mg tablet take 4 tablets by mouth daily for sugar    glucose blood VI test strips (ASCENSIA AUTODISC VI, ONE TOUCH ULTRA TEST VI) strip Use to check sugar daily dx e11.9, not on insulin. One touch ultra blue    Lancets misc Use to check sugar daily. One touch delica 06WT    allopurinol (ZYLOPRIM) 300 mg tablet TAKE ONE TABLET BY MOUTH ONCE DAILY TO  PREVENT  GOUT    ascorbic acid, vitamin C, (VITAMIN C) 500 mg tablet Take 500 mg by mouth two (2) times a day.  cholecalciferol, vitamin D3, (VITAMIN D3) 2,000 unit tab Take  by mouth.  aspirin delayed-release 81 mg tablet Take  by mouth daily.  magnesium oxide (MAG-OX) 400 mg tablet Take 400 mg by mouth daily.  multivitamin (ONE A DAY) tablet Take 1 tablet by mouth daily.  naproxen sodium (ALEVE) 220 mg cap Take 2 Tabs by mouth daily.  potassium 99 mg tablet Take 99 mg by mouth daily. No current facility-administered medications for this visit.       Allergies   Allergen Reactions    Streptomycin Unknown (comments)     Family History   Problem Relation Age of Onset    Diabetes Father     Heart Disease Father     Cancer Father         colon    Other Father         Renal stones    No Known Problems Mother     Other Brother         Renal stones    Diabetes Brother     Arthritis-rheumatoid Brother     Other Brother         Renal Stones    Heart Disease Brother     Cancer Maternal Grandfather         Unknown     Social History     Socioeconomic History    Marital status:      Spouse name: Not on file    Number of children: Not on file    Years of education: Not on file    Highest education level: Not on file   Social Needs    Financial resource strain: Not on file    Food insecurity - worry: Not on file    Food insecurity - inability: Not on file   Crackle needs - medical: Not on file   Crackle needs - non-medical: Not on file   Occupational History    Not on file   Tobacco Use    Smoking status: Never Smoker    Smokeless tobacco: Never Used   Substance and Sexual Activity    Alcohol use: Yes     Alcohol/week: 1.2 oz     Types: 2 Shots of liquor per week    Drug use: No    Sexual activity: No   Other Topics Concern     Service No    Blood Transfusions No    Caffeine Concern No    Occupational Exposure No    Hobby Hazards No    Sleep Concern No    Stress Concern No    Weight Concern No    Special Diet No    Back Care Yes    Exercise No    Bike Helmet No    Seat Belt Yes    Self-Exams Yes   Social History Narrative    Not on file     Review of Systems:  - Constitutional Symptoms: no fevers, chills, weight loss  - Eyes: no blurry vision or double vision  - Cardiovascular: no chest pain or palpitations  - Respiratory: no cough or shortness of breath  - Gastrointestinal: no dysphagia or abdominal pain  - Musculoskeletal: no joint pains or weakness  - Integumentary: no rashes  - Neurological: no numbness, tingling, or headaches  - Psychiatric: no depression or anxiety  - Endocrine: no heat or cold intolerance, no polyuria or polydipsia    Physical Examination:  Blood pressure 128/57, pulse 87, height 5' (1.524 m), weight 150 lb 9.6 oz (68.3 kg).   - General: pleasant, no distress, good eye contact  - HEENT: no exopthalmos, no periorbital edema, no scleral/conjunctival injection, EOMI, no lid lag or stare  - Neck: supple, no thyromegaly, masses, lymph nodes, or carotid bruits, no supraclavicular or dorsocervical fat pads  - Cardiovascular: regular, normal rate, normal S1 and S2, no murmurs/rubs/gallops, 2+ dorsalis pedis pulses bilaterally  - Respiratory: clear to auscultation bilaterally  - Gastrointestinal: soft, nontender, nondistended, no masses, no hepatosplenomegaly  - Musculoskeletal: no proximal muscle weakness in upper or lower extremities  - Integumentary: no acanthosis nigricans, no rashes, no edema,   - Neurological: reflexes 2+ at biceps, no tremor  - Psychiatric: normal mood and affect    Data Reviewed:   Component      Latest Ref Rng & Units 8/20/2018 5/15/2018 5/11/2018 11/6/2017           3:24 PM  1:22 PM  2:18 PM 11:51 AM   Glucose      65 - 99 mg/dL 96  101 (H) 69   BUN      8 - 27 mg/dL 20  29 (H) 16   Creatinine      0.57 - 1.00 mg/dL 0.85  1.06 (H) 0.81   GFR est non-AA      >59 mL/min/1.73 67  51 (L) 71   GFR est AA      >59 mL/min/1.73 77  59 (L) 82   BUN/Creatinine ratio      12 - 28 24  27 20   Sodium      134 - 144 mmol/L 144  139 142   Potassium      3.5 - 5.2 mmol/L 5.2  4.8 4.8   Chloride      96 - 106 mmol/L 102  97 99   CO2      20 - 29 mmol/L 22  23 23   Calcium      8.7 - 10.3 mg/dL 11.4 (H)  12.1 (H) 11.2 (H)   Protein, total      6.0 - 8.5 g/dL       Albumin      3.5 - 4.8 g/dL       GLOBULIN, TOTAL      1.5 - 4.5 g/dL       A-G Ratio      1.2 - 2.2       Bilirubin, total      0.0 - 1.2 mg/dL       Alk.  phosphatase      39 - 117 IU/L       AST      0 - 40 IU/L       ALT (SGPT)      0 - 32 IU/L       PTH, Intact      15 - 65 pg/mL  31       Component      Latest Ref Rng & Units 6/12/2017 9/15/2016 4/4/2016 6/11/2015          12:46 PM 10:15 AM 12:01 PM  1:03 PM   Glucose      65 - 99 mg/dL 85 79 100 (H) 104 (H)   BUN      8 - 27 mg/dL 21 27 19 27   Creatinine      0.57 - 1.00 mg/dL 0.70 1.04 (H) 0.88 0.96   GFR est non-AA      >59 mL/min/1.73 86 53 (L) 65 59 (L)   GFR est AA      >59 mL/min/1.73 99 61 75 68   BUN/Creatinine ratio      12 - 28 30 (H) 26 22 28 (H)   Sodium      134 - 144 mmol/L 139 143 141 142   Potassium      3.5 - 5.2 mmol/L 5.1 5.0 4.9 5.0   Chloride      96 - 106 mmol/L 96 99 101 99   CO2      20 - 29 mmol/L 24 26 23 21   Calcium 8.7 - 10.3 mg/dL 11.9 (H) 11.3 (H) 11.3 (H) 11.2 (H)   Protein, total      6.0 - 8.5 g/dL 7.8 7.1     Albumin      3.5 - 4.8 g/dL 5.1 (H) 4.7     GLOBULIN, TOTAL      1.5 - 4.5 g/dL 2.7 2.4     A-G Ratio      1.2 - 2.2 1.9 2.0     Bilirubin, total      0.0 - 1.2 mg/dL 0.4 0.2     Alk. phosphatase      39 - 117 IU/L 68 59     AST      0 - 40 IU/L 37 22     ALT (SGPT)      0 - 32 IU/L 44 (H) 23     PTH, Intact      15 - 65 pg/mL         Component      Latest Ref Rng & Units 3/17/2015 11/17/2014 3/28/2014 2/3/2014           2:41 PM  9:45 AM 11:57 AM 11:47 AM   Glucose      65 - 99 mg/dL 83 120 (H) 96 74   BUN      8 - 27 mg/dL 26 27 28 (H) 24   Creatinine      0.57 - 1.00 mg/dL 0.65 0.86 0.84 0.76   GFR est non-AA      >59 mL/min/1.73 89 68 70 79   GFR est AA      >59 mL/min/1.73 103 78 81 91   BUN/Creatinine ratio      12 - 28 40 (H) 31 (H) 33 (H) 32 (H)   Sodium      134 - 144 mmol/L 143 140 139 142   Potassium      3.5 - 5.2 mmol/L 5.1 4.9 4.7 4.5   Chloride      96 - 106 mmol/L 98 101 101 98   CO2      20 - 29 mmol/L 23 24 27 27   Calcium      8.7 - 10.3 mg/dL 12.1 (H) 12.3 (H) 10.7 (H) 11.6 (H)   Protein, total      6.0 - 8.5 g/dL 7.2 7.5 7.0 7.2   Albumin      3.5 - 4.8 g/dL 5.0 (H) 4.6 4.4 4.7   GLOBULIN, TOTAL      1.5 - 4.5 g/dL 2.2 2.9 2.6 2.5   A-G Ratio      1.2 - 2.2 2.3 1.6 1.7 1.9   Bilirubin, total      0.0 - 1.2 mg/dL 0.3 0.4 0.4 0.4   Alk.  phosphatase      39 - 117 IU/L 64 85 68 55   AST      0 - 40 IU/L 20 20 19 23   ALT (SGPT)      0 - 32 IU/L 16 19 16 21   PTH, Intact      15 - 65 pg/mL         Component      Latest Ref Rng & Units 2/3/2014          11:47 AM   Glucose      65 - 99 mg/dL    BUN      8 - 27 mg/dL    Creatinine      0.57 - 1.00 mg/dL    GFR est non-AA      >59 mL/min/1.73    GFR est AA      >59 mL/min/1.73    BUN/Creatinine ratio      12 - 28    Sodium      134 - 144 mmol/L    Potassium      3.5 - 5.2 mmol/L    Chloride      96 - 106 mmol/L    CO2      20 - 29 mmol/L    Calcium 8.7 - 10.3 mg/dL    Protein, total      6.0 - 8.5 g/dL    Albumin      3.5 - 4.8 g/dL    GLOBULIN, TOTAL      1.5 - 4.5 g/dL    A-G Ratio      1.2 - 2.2    Bilirubin, total      0.0 - 1.2 mg/dL    Alk. phosphatase      39 - 117 IU/L    AST      0 - 40 IU/L    ALT (SGPT)      0 - 32 IU/L    PTH, Intact      15 - 65 pg/mL 37     Assessment/Plan:   1. Hypercalcemia    2. Hyperparathyroidism ([address]     Pt has had these elevated Ca levels since 2011. Her PTH has been consistently not elevated (30's) which makes me think there could be some other etiology at play. We discussed at length the Ca-PTH axis and how the body regulates it Ca levels. Her Father and brothers had renal stones, which makes one think about [address]. Will start with ordering a 24 hour urine collection for Ca and Cr, Renal function panel, Mag, Phos, PTH, PTH-rp, 25-OH Vitamin D, 1,25-OH Vitamin D, Vitamin A and E, SPEP and UPEP. Will order a DXA scan to look for evidence of osteoporosis. Pt is leaving for vacation on 11/15/18 and will not be back before February 2019. We discussed that I would call her with any results that come back before she leaves. Pt notes that she does not have a cell phone and could not be easily reached when she leaves [facility], though her daughter could get in touch with her (though she does not know her daughter's phone number). Pt encouraged to perform the 24 hour urine collection on Sunday and have her labs drawn next Monday so that we could have some results before she leaves [facility]. Pt voices understanding and agreement with the plan. RTC 3 months    Follow-up Disposition:  Return in about 3 months (around 2/9/2019).     Copy sent to:  Dr. Alesha Menchaca

## 2018-11-12 RX ORDER — ALLOPURINOL 300 MG/1
TABLET ORAL
Qty: 90 TAB | Refills: 3 | Status: SHIPPED | OUTPATIENT
Start: 2018-11-12 | End: 2020-01-06

## 2018-11-21 DIAGNOSIS — E21.3 HYPERPARATHYROIDISM (HCC): ICD-10-CM

## 2018-11-21 DIAGNOSIS — E83.52 HYPERCALCEMIA: ICD-10-CM

## 2018-12-05 ENCOUNTER — DOCUMENTATION ONLY (OUTPATIENT)
Dept: ENDOCRINOLOGY | Age: 78
End: 2018-12-05

## 2019-01-31 LAB
CALCIUM 24H UR-MCNC: 7.2 MG/DL
CALCIUM 24H UR-MRATE: 72 MG/24 HR (ref 100–300)
CREAT 24H UR-MRATE: 857 MG/24 HR (ref 800–1800)
CREAT UR-MCNC: 85.7 MG/DL

## 2019-02-01 LAB
1,25(OH)2D3 SERPL-MCNC: 54.8 PG/ML (ref 19.9–79.3)
25(OH)D3+25(OH)D2 SERPL-MCNC: 30.4 NG/ML (ref 30–100)
A-TOCOPHEROL VIT E SERPL-MCNC: 17.7 MG/L (ref 9–29)
ALBUMIN MFR UR ELPH: 56.3 %
ALBUMIN SERPL ELPH-MCNC: 3.8 G/DL (ref 2.9–4.4)
ALBUMIN SERPL-MCNC: 4.4 G/DL (ref 3.5–4.8)
ALBUMIN/GLOB SERPL: 1.1 {RATIO} (ref 0.7–1.7)
ALPHA1 GLOB MFR UR ELPH: 2.5 %
ALPHA1 GLOB SERPL ELPH-MCNC: 0.3 G/DL (ref 0–0.4)
ALPHA2 GLOB MFR UR ELPH: 12.9 %
ALPHA2 GLOB SERPL ELPH-MCNC: 1.2 G/DL (ref 0.4–1)
B-GLOBULIN MFR UR ELPH: 17.8 %
B-GLOBULIN SERPL ELPH-MCNC: 1.3 G/DL (ref 0.7–1.3)
BUN SERPL-MCNC: 19 MG/DL (ref 8–27)
BUN/CREAT SERPL: 22 (ref 12–28)
CALCIUM SERPL-MCNC: 11.1 MG/DL (ref 8.7–10.3)
CHLORIDE SERPL-SCNC: 103 MMOL/L (ref 96–106)
CO2 SERPL-SCNC: 21 MMOL/L (ref 20–29)
CREAT SERPL-MCNC: 0.86 MG/DL (ref 0.57–1)
GAMMA GLOB MFR UR ELPH: 10.5 %
GAMMA GLOB SERPL ELPH-MCNC: 0.7 G/DL (ref 0.4–1.8)
GAMMA TOCOPHEROL SERPL-MCNC: 2 MG/L (ref 0.5–4.9)
GLOBULIN SER CALC-MCNC: 3.4 G/DL (ref 2.2–3.9)
GLUCOSE SERPL-MCNC: 161 MG/DL (ref 65–99)
M PROTEIN MFR UR ELPH: NORMAL %
M PROTEIN SERPL ELPH-MCNC: ABNORMAL G/DL
MAGNESIUM SERPL-MCNC: 1.8 MG/DL (ref 1.6–2.3)
PHOSPHATE SERPL-MCNC: 3 MG/DL (ref 2.5–4.5)
PLEASE NOTE, 011150: ABNORMAL
PLEASE NOTE:, 133800: NORMAL
POTASSIUM SERPL-SCNC: 4.6 MMOL/L (ref 3.5–5.2)
PROT SERPL-MCNC: 7.2 G/DL (ref 6–8.5)
PROT UR-MCNC: 24.2 MG/DL
PTH-INTACT SERPL-MCNC: 37 PG/ML (ref 15–65)
SODIUM SERPL-SCNC: 143 MMOL/L (ref 134–144)
VIT A SERPL-MCNC: 82.6 UG/DL (ref 22–69.5)

## 2019-02-05 LAB — PTH RELATED PROT SERPL-SCNC: <2 PMOL/L

## 2019-02-27 ENCOUNTER — OFFICE VISIT (OUTPATIENT)
Dept: ENDOCRINOLOGY | Age: 79
End: 2019-02-27

## 2019-02-27 VITALS
WEIGHT: 155 LBS | SYSTOLIC BLOOD PRESSURE: 138 MMHG | HEART RATE: 81 BPM | DIASTOLIC BLOOD PRESSURE: 57 MMHG | BODY MASS INDEX: 30.43 KG/M2 | HEIGHT: 60 IN

## 2019-02-27 DIAGNOSIS — E83.52 HYPERCALCEMIA: Primary | ICD-10-CM

## 2019-02-27 NOTE — PROGRESS NOTES
Chief Complaint   Patient presents with    Elevated Blood Calcium     pcp and pharmacy verified   Records since last visit reviewed  History of Present Illness: Mmeo Farley is a 70 y.o. female here for follow up of hypercalcemia. Looking back through her records pt's Ca levels have ranged between 11.3-12.3 since 2014. She has had two PTH levels drawn during this period and they were 37 and 31. In 2016 pt had a DXA scan which was normal (no osteopenia, no osteoporosis). Pt notes her Ca was first found to be elevated when she was diagnosed with DM in 2011 or 2012 \"before then I had never had calcium problems\". They started by decreased her MVI to lower her Ca intake, and all her medications that had Ca in them were changed. She had been on HCTZ, but Dr. May Deras stopped this in 2013. She still takes a MVI 3 days per week, she does eat a lot of cheese, but does not drink milk regularly. She takes a Vitamin D supplement \"once in a while\". Since our last visit she has a through evaluation her Ca was 11.1 with PTH of 37 (low normal) PTH-rp negative, 1,25-OH Vitamin D 54.8 (NL), 25 OH Vitamin D 30.4, SPEP and UPEP normal. She had a DXA scan which was normal (no osteoporosis). She did have an elevated Vitamin A at 82.6, which was the only abnormlaity found in the work-up. Her 24 hour urine Ca was not elevated (72). She denies issues of renal stones, hematuria or dysuria, mood swings, AMS or confusion. Pt is still taking the MVI three days per week. She notes she does drink plenty of water, staying well hydrated. Current Outpatient Medications   Medication Sig    rosuvastatin (CRESTOR) 5 mg tablet Take 1 Tab by mouth Three (3) times a week.  allopurinol (ZYLOPRIM) 300 mg tablet TAKE 1 TABLET BY MOUTH ONCE DAILY TO  PREVENT  GOUT    cyanocobalamin (VITAMIN B-12) 500 mcg tablet Take 500 mcg by mouth daily.  SITagliptin (JANUVIA) 100 mg tablet Take 1 Tab by mouth daily.  Indications: sugar.    lisinopril (PRINIVIL, ZESTRIL) 20 mg tablet TAKE ONE TABLET BY MOUTH ONCE DAILY for pressure and heart    metFORMIN ER (GLUCOPHAGE XR) 500 mg tablet take 4 tablets by mouth daily for sugar    potassium 99 mg tablet Take 99 mg by mouth daily.  ascorbic acid, vitamin C, (VITAMIN C) 500 mg tablet Take 500 mg by mouth two (2) times a day.  cholecalciferol, vitamin D3, (VITAMIN D3) 2,000 unit tab Take  by mouth.  aspirin delayed-release 81 mg tablet Take  by mouth daily.  magnesium oxide (MAG-OX) 400 mg tablet Take 400 mg by mouth daily.  multivitamin (ONE A DAY) tablet Take 1 tablet by mouth daily.  naproxen sodium (ALEVE) 220 mg cap Take 2 Tabs by mouth daily.  glucose blood VI test strips (ASCENSIA AUTODISC VI, ONE TOUCH ULTRA TEST VI) strip Use to check sugar daily dx e11.9, not on insulin. One touch ultra blue    Lancets misc Use to check sugar daily. One touch delica [id number]     No current facility-administered medications for this visit. Allergies   Allergen Reactions    Streptomycin Unknown (comments)     Review of Systems:  - Cardiovascular: no chest pain  - Neurological: no tremors  - Integumentary: skin is normal    Physical Examination:  Blood pressure 138/57, pulse 81, height 5' (1.524 m), weight 155 lb (70.3 kg).   - General: pleasant, no distress, good eye contact   - Psychiatric: normal mood and affect    Data Reviewed:   Component      Latest Ref Rng & Units 1/30/2019 1/29/2019 1/29/2019           2:41 PM  8:00 AM  8:00 AM   Calcium,urine mg/dL      Not Estab. mg/dL   7.2   Calcium mg/24 hr      100.0 - 300.0 mg/24 hr   72.0 (L)   Creatinine, urine      Not Estab. mg/dL  85.7    Creatinine, urine 24 hr      800 - 1,800 mg/24 hr  857    PTHRP (PTH-RELATED PEPTIDE)      pmol/L <2.0       Component      Latest Ref Rng & Units 1/28/2019 1/28/2019 1/28/2019          11:11 AM 11:00 AM 11:00 AM   Calcitriol (Vit D 1, 25 di-OH)      19.9 - 79.3 pg/mL   54.8   VITAMIN D, 25-HYDROXY      30.0 - 100.0 ng/mL  30.4      Component      Latest Ref Rng & Units 1/28/2019 1/28/2019 1/28/2019          11:00 AM 11:00 AM 11:00 AM   Protein, total      6.0 - 8.5 g/dL   7.2   Albumin      2.9 - 4.4 g/dL   3.8   Alpha-1-globulin      0.0 - 0.4 g/dL   0.3   ALPHA-2 GLOBULIN      0.4 - 1.0 g/dL   1.2 (H)   Beta globulin      0.7 - 1.3 g/dL   1.3   Gamma globulin      0.4 - 1.8 g/dL   0.7   M-Laron      Not Observed g/dL   Not Observed   Globulin, total      2.2 - 3.9 g/dL   3.4   A/G ratio      0.7 - 1.7   1.1   Please note         Comment   Protein total, urine      Not Estab. mg/dL  24.2    Albumin, urine      %  56.3    Alpha-1-Globulin, urine      %  2.5    Alpha-2-Globulin, urine      %  12.9    Beta Globulin, urine      %  17.8    Gamma Globulin, urine      %  10.5    M-Laron, %      Not Observed %  Not Observed    Please note        Comment    VITAMIN A      22.0 - 69.5 ug/dL 82.6 (H)     Vitamin E/alpha-tocopherol      9.0 - 29.0 mg/L 17.7     Vitamin E/gamma-tocopherol      0.5 - 4.9 mg/L 2.0       Component      Latest Ref Rng & Units 1/28/2019 1/28/2019 1/28/2019 1/28/2019          11:00 AM 11:00 AM 11:00 AM 10:43 AM   Glucose      65 - 99 mg/dL   161 (H)    BUN      8 - 27 mg/dL   19    Creatinine      0.57 - 1.00 mg/dL   0.86    GFR est non-AA      >59 mL/min/1.73   66    GFR est AA      >59 mL/min/1.73   76    BUN/Creatinine ratio      12 - 28   22    Sodium      134 - 144 mmol/L   143    Potassium      3.5 - 5.2 mmol/L   4.6    Chloride      96 - 106 mmol/L   103    CO2      20 - 29 mmol/L   21    Calcium      8.7 - 10.3 mg/dL   11.1 (H)    Phosphorus      2.5 - 4.5 mg/dL   3.0    Albumin      3.5 - 4.8 g/dL   4.4    Hemoglobin A1c, (calculated)      4.8 - 5.6 %    6.6 (H)   Estimated average glucose      mg/dL    143   Magnesium      1.6 - 2.3 mg/dL  1.8     PTH, Intact      15 - 65 pg/mL 37        Component      Latest Ref Rng & Units 1/28/2019          10:43 AM   Cholesterol, total      100 - 199 mg/dL 184   Triglyceride      0 - 149 mg/dL 347 (H)   HDL Cholesterol      >39 mg/dL 38 (L)   VLDL, calculated      5 - 40 mg/dL 69 (H)   LDL, calculated      0 - 99 mg/dL 77       Assessment/Plan:   1) Hypercalcemia > The only abnormality found was an elevated Vitamin A, which can cause hypercalcemia. The rest of her hypercalcemia work-up was unremarkable. Pt instructed to stop the multivitamin, and we discussed the sources of Vitamin A in her diet. Animal: Liver, Kidney, Egg Yolk, Butter  Plant: Carrot, Sweet Potato, Leafy Greens. Pt to stop eating organ meat, to change to egg whites or egg substitutes and to decrease her butter usage. Pt to stop eating sweet potatoes and cut back on her carrot intake. She is not having issues of complications from her high Ca (Stones, Bones, Groans) so as long as she is staying well hydrated and avoid the Vitamin A sources, as noted above, she should be ok. Pt voices understanding and agreement with the plan. Patient Instructions   1) Your Vitamin A level was high. Stop taking the multivitamin. The biggest source of vitamin A in the diet is Liver, kidney, egg yolks and butter from the animal sources. From the plant sources the highest contents are from carrot, Sweet potato and leafy greans. I would recommend you stop eating sweet potato, cut back on your carrots, decrease your butter use and switch to \"egg beaters\" or egg substitutes.         Copy sent to:  Dr. Samaria Mendoza

## 2019-02-27 NOTE — PATIENT INSTRUCTIONS
1) Your Vitamin A level was high. Stop taking the multivitamin. The biggest source of vitamin A in the diet is Liver, kidney, egg yolks and butter from the animal sources. From the plant sources the highest contents are from carrot, Sweet potato and leafy greans. I would recommend you stop eating sweet potato, cut back on your carrots, decrease your butter use and switch to \"egg beaters\" or egg substitutes.

## 2019-04-01 DIAGNOSIS — E11.9 TYPE 2 DIABETES MELLITUS WITHOUT COMPLICATION, WITHOUT LONG-TERM CURRENT USE OF INSULIN (HCC): ICD-10-CM

## 2019-04-03 RX ORDER — METFORMIN HYDROCHLORIDE 500 MG/1
TABLET, EXTENDED RELEASE ORAL
Qty: 360 TAB | Refills: 3 | Status: SHIPPED | OUTPATIENT
Start: 2019-04-03 | End: 2020-05-01

## 2019-04-03 RX ORDER — SITAGLIPTIN 100 MG/1
TABLET, FILM COATED ORAL
Qty: 90 TAB | Refills: 3 | Status: SHIPPED | OUTPATIENT
Start: 2019-04-03 | End: 2020-07-20 | Stop reason: ALTCHOICE

## 2019-05-14 DIAGNOSIS — I10 ESSENTIAL HYPERTENSION: ICD-10-CM

## 2019-05-15 RX ORDER — LISINOPRIL 20 MG/1
TABLET ORAL
Qty: 90 TAB | Refills: 3 | Status: SHIPPED | OUTPATIENT
Start: 2019-05-15 | End: 2020-05-01

## 2019-05-20 PROBLEM — E21.0 PRIMARY HYPERPARATHYROIDISM (HCC): Status: ACTIVE | Noted: 2019-05-20

## 2021-04-07 ENCOUNTER — OFFICE VISIT (OUTPATIENT)
Dept: FAMILY MEDICINE CLINIC | Age: 81
End: 2021-04-07
Payer: MEDICARE

## 2021-04-07 VITALS
RESPIRATION RATE: 16 BRPM | SYSTOLIC BLOOD PRESSURE: 106 MMHG | WEIGHT: 137.2 LBS | HEIGHT: 60 IN | BODY MASS INDEX: 26.93 KG/M2 | DIASTOLIC BLOOD PRESSURE: 62 MMHG | OXYGEN SATURATION: 95 % | TEMPERATURE: 97.5 F | HEART RATE: 91 BPM

## 2021-04-07 DIAGNOSIS — E11.21 TYPE 2 DIABETES WITH NEPHROPATHY (HCC): Primary | ICD-10-CM

## 2021-04-07 DIAGNOSIS — I10 ESSENTIAL HYPERTENSION: ICD-10-CM

## 2021-04-07 DIAGNOSIS — E83.52 HYPERCALCEMIA: ICD-10-CM

## 2021-04-07 DIAGNOSIS — E78.00 PURE HYPERCHOLESTEROLEMIA: ICD-10-CM

## 2021-04-07 PROCEDURE — 1090F PRES/ABSN URINE INCON ASSESS: CPT | Performed by: FAMILY MEDICINE

## 2021-04-07 PROCEDURE — G8510 SCR DEP NEG, NO PLAN REQD: HCPCS | Performed by: FAMILY MEDICINE

## 2021-04-07 PROCEDURE — 1100F PTFALLS ASSESS-DOCD GE2>/YR: CPT | Performed by: FAMILY MEDICINE

## 2021-04-07 PROCEDURE — G8752 SYS BP LESS 140: HCPCS | Performed by: FAMILY MEDICINE

## 2021-04-07 PROCEDURE — 99214 OFFICE O/P EST MOD 30 MIN: CPT | Performed by: FAMILY MEDICINE

## 2021-04-07 PROCEDURE — 3288F FALL RISK ASSESSMENT DOCD: CPT | Performed by: FAMILY MEDICINE

## 2021-04-07 PROCEDURE — G8419 CALC BMI OUT NRM PARAM NOF/U: HCPCS | Performed by: FAMILY MEDICINE

## 2021-04-07 PROCEDURE — G8536 NO DOC ELDER MAL SCRN: HCPCS | Performed by: FAMILY MEDICINE

## 2021-04-07 PROCEDURE — G8754 DIAS BP LESS 90: HCPCS | Performed by: FAMILY MEDICINE

## 2021-04-07 PROCEDURE — G8427 DOCREV CUR MEDS BY ELIG CLIN: HCPCS | Performed by: FAMILY MEDICINE

## 2021-04-07 RX ORDER — AMLODIPINE BESYLATE 2.5 MG/1
2.5 TABLET ORAL
Qty: 30 TAB | Refills: 11 | Status: SHIPPED | OUTPATIENT
Start: 2021-04-07

## 2021-04-07 RX ORDER — GABAPENTIN 100 MG/1
100 CAPSULE ORAL
Qty: 30 CAP | Refills: 3 | Status: SHIPPED | OUTPATIENT
Start: 2021-04-07 | End: 2021-07-12

## 2021-04-07 NOTE — PROGRESS NOTES
1. Have you been to the ER, urgent care clinic since your last visit? Hospitalized since your last visit? No    2. Have you seen or consulted any other health care providers outside of the [address] [place] [address] since your last visit? Include any pap smears or colon screening. Yes When: pedotrist    Pain 0Jose Juarez Neighbor is a 68 y.o. female     Subjective:   Blood sugar problem (-135), Hypertension (Patient states she had an episode of have a BP of 184/64 dropped to 90/49, this happened in February), and Hand Problem (numbness at finger tips, tingling, unable to  sometimes, noticing symptoms of trigger finger again)    BP issues  Pt presents with above issue. Happened at home. No trauma, no overexertion. No recent travel. Diabetes. Sugars controlled well since last visit. Running mid 100's on on meter. Hypoglycemia: none  Tolerating current treatment well  Current medications include Metformin ER 2000mg every day and farxiga 10mg every day, changed last visit.     Lab Results   Component Value Date/Time    Hemoglobin A1c 5.9 (H) 01/15/2021 01:02 PM    Hemoglobin A1c 6.3 (H) 10/19/2020 03:16 PM    Hemoglobin A1c 6.9 (H) 07/20/2020 01:25 PM    Glucose 87 01/15/2021 01:02 PM    Microalb/Creat ratio (ug/mg creat.) 83 (H) 02/10/2020 03:16 PM    Microalbumin/creat ratio (POC) <30 11/06/2017 12:00 PM    LDL,Direct 91 10/19/2020 03:16 PM    LDL, calculated 66 01/15/2021 01:02 PM    LDL, calculated Comment 02/10/2020 03:16 PM    Creatinine 0.84 01/15/2021 01:02 PM     Lab Results   Component Value Date/Time    Sodium 141 01/15/2021 01:02 PM    Potassium 4.8 01/15/2021 01:02 PM    Chloride 104 01/15/2021 01:02 PM    CO2 25 01/15/2021 01:02 PM    Glucose 87 01/15/2021 01:02 PM    BUN 26 01/15/2021 01:02 PM    Creatinine 0.84 01/15/2021 01:02 PM    BUN/Creatinine ratio 31 (H) 01/15/2021 01:02 PM    GFR est AA 77 01/15/2021 01:02 PM    GFR est non-AA 67 01/15/2021 01:02 PM    Calcium 11.5 (H) 01/15/2021 01:02 PM     Last eye exam performed 5/18, Dr. Katie Hernandez at [address] [place] [address]. Ofc, no DR per pt. Last foot exam 5/2018 with Podiatry [place]. Anyi Thomas, no abn. Hyperlipidemia. No problems with headaches since visit. Taking crestor 5mg 3x/ week. Daily doses have caused myalgias, so we are con't at TIW for now. Lab Results   Component Value Date/Time    Cholesterol, total 156 01/15/2021 01:02 PM    HDL Cholesterol 40 01/15/2021 01:02 PM    LDL,Direct 91 10/19/2020 03:16 PM    LDL, calculated 66 01/15/2021 01:02 PM    LDL, calculated Comment 02/10/2020 03:16 PM    VLDL, calculated 50 (H) 01/15/2021 01:02 PM    VLDL, calculated Comment 02/10/2020 03:16 PM    Triglyceride 313 (H) 01/15/2021 01:02 PM     Lab Results   Component Value Date/Time    ALT (SGPT) 14 07/20/2020 01:25 PM    Alk. phosphatase 70 07/20/2020 01:25 PM    Bilirubin, total 0.3 07/20/2020 01:25 PM     PMH, SH, Medications/Allergies: reviewed, on chart. Current Outpatient Medications   Medication Sig    metFORMIN ER (GLUCOPHAGE XR) 500 mg tablet Take 2 Tabs by mouth daily (with dinner). Indications: diabetes    allopurinoL (ZYLOPRIM) 300 mg tablet TAKE 1 TABLET BY MOUTH ONCE DAILY FOR GOUT    dapagliflozin (Farxiga) 10 mg tab tablet Half pill daily  Indications: sugar    rosuvastatin (CRESTOR) 5 mg tablet 1 po QOD  Indications: heart and cholesterol    lisinopriL (PRINIVIL, ZESTRIL) 20 mg tablet TAKE 1 TABLET BY MOUTH ONCE DAILY FOR PRESSURE AND HEART    glucose blood VI test strips (ASCENSIA AUTODISC VI, ONE TOUCH ULTRA TEST VI) strip Use to check sugar daily dx e11.9, not on insulin. One touch ultra blue    lancets misc Use to check sugar daily. One touch delica 09BJ    cyanocobalamin (VITAMIN B-12) 500 mcg tablet Take 500 mcg by mouth daily.  potassium 99 mg tablet Take 99 mg by mouth daily.  ascorbic acid, vitamin C, (VITAMIN C) 500 mg tablet Take 500 mg by mouth two (2) times a day.     aspirin delayed-release 81 mg tablet Take  by mouth daily.  magnesium oxide (MAG-OX) 400 mg tablet Take 400 mg by mouth daily.  naproxen sodium (ALEVE) 220 mg cap Take 3 Tabs by mouth daily. No current facility-administered medications for this visit. ROS:  Constitutional: No fever, chills or weight loss  Respiratory: No cough, SOB   CV: No chest pain or Palpitations    Visit Vitals  /62 (BP 1 Location: Left arm)   Pulse 91   Temp 97.5 °F (36.4 °C) (Oral)   Resp 16   Ht 5' (1.524 m)   Wt 137 lb 3.2 oz (62.2 kg)   SpO2 95%   BMI 26.80 kg/m²     Wt Readings from Last 3 Encounters:   04/07/21 137 lb 3.2 oz (62.2 kg)   10/19/20 145 lb (65.8 kg)   07/20/20 152 lb 12.8 oz (69.3 kg)   -7#  BP Readings from Last 3 Encounters:   04/07/21 106/62   10/19/20 122/62   07/20/20 120/65     Physical Examination: General appearance - alert, well appearing, and in no distress  Mental status - alert, oriented to person, place, and time  Eyes - pupils equal and reactive, extraocular eye movements intact  ENT - bilateral external ears and nose normal. Normal lips  Neck - supple, no significant adenopathy, no thyromegaly or mass  Lymphatics - no palpable lymphadenopathy, no hepatosplenomegaly  Chest - clear to auscultation, no wheezes, rales or rhonchi, symmetric air entry. Heart - normal rate, regular rhythm, normal S1, S2, no murmurs, rubs, clicks or gallops  Extremities - peripheral pulses normal, no pedal edema, no clubbing or cyanosis. A/P:  DM2  Doing well with farxiga 5mg/d and metformin er 2g/d. Consider add welchol 625mg BID to backfill if needed. For her DPN, try adding gabapentin 100mg qhs. Plan bump to 300mg if not getting good relief. Lipids  Norman the crestor 5mg TIW ok. Spring lipid check fair. Have not had a lot of success with tolerating dose above that. Recheck. HTN  Ok on home checks. con't plain lisinopril 20mg every day. Recheck labs. Hypercalcemia  C/W primary hyperPTH, with occ BP spikes.  Add amlodipine 2.5mg daily PRN high blood pressure >150mmHg, Recheck labs. Avoid thiazides for now. Lab Results   Component Value Date/Time    Calcium 11.5 (H) 01/15/2021 01:02 PM    Phosphorus 3.0 2019 11:00 AM    PTH, Intact 37 2019 11:00 AM     F/U 3mo for routine visit, labs visit soon. 1. Have you been to the ER, urgent care clinic since your last visit? Hospitalized since your last visit? No    2. Have you seen or consulted any other health care providers outside of the [address] [place] [address] since your last visit? Include any pap smears or colon screening. No    Identified pt with two pt identifiers(name and ). Reviewed record in preparation for visit and have obtained necessary documentation.     Symptom review:    NO  Fever   NO  Shaking chills  NO  Cough  NO Headaches  NO  Body aches  NO  Coughing up blood  NO  Chest congestion  NO  Chest pain  NO  Shortness of breath  NO  Profound Loss of smell/taste  NO  Nausea/Vomiting   NO  Loose stool/Diarrhea  NO  any skin issues    Patient Risk Factors Reviewed as follows:    NO  have you or any one in your home have had or has a pending covid test  NO  have you been in Close contact with confirmed COVID19 patient   NO  History of recent travel to affected geographical areas within the past 14 days  NO  COPD  NO  Active Cancer/Leukemia/Lymphoma/Chemotherapy  NO  Oral steroid use  NO  Pregnant  yes  Diabetes Mellitus  NO  Heart disease  NO  Asthma  NO Health care worker at home  NO Health care worker  NO Is there a Pregnant Woman in the home  NO Dialysis pt in the home   NO a large number of people living in the home

## 2021-04-08 LAB
ANION GAP SERPL CALC-SCNC: 7 MMOL/L (ref 5–15)
BUN SERPL-MCNC: 31 MG/DL (ref 6–20)
BUN/CREAT SERPL: 32 (ref 12–20)
CALCIUM SERPL-MCNC: 11.5 MG/DL (ref 8.5–10.1)
CHLORIDE SERPL-SCNC: 104 MMOL/L (ref 97–108)
CO2 SERPL-SCNC: 27 MMOL/L (ref 21–32)
CREAT SERPL-MCNC: 0.98 MG/DL (ref 0.55–1.02)
CREAT UR-MCNC: 29.2 MG/DL
GLUCOSE SERPL-MCNC: 91 MG/DL (ref 65–100)
MICROALBUMIN UR-MCNC: 0.55 MG/DL
MICROALBUMIN/CREAT UR-RTO: 19 MG/G (ref 0–30)
POTASSIUM SERPL-SCNC: 4.7 MMOL/L (ref 3.5–5.1)
SODIUM SERPL-SCNC: 138 MMOL/L (ref 136–145)

## 2021-07-12 ENCOUNTER — OFFICE VISIT (OUTPATIENT)
Dept: FAMILY MEDICINE CLINIC | Age: 81
End: 2021-07-12
Payer: MEDICARE

## 2021-07-12 VITALS
HEIGHT: 60 IN | WEIGHT: 138.6 LBS | BODY MASS INDEX: 27.21 KG/M2 | RESPIRATION RATE: 22 BRPM | DIASTOLIC BLOOD PRESSURE: 60 MMHG | HEART RATE: 90 BPM | TEMPERATURE: 98.3 F | SYSTOLIC BLOOD PRESSURE: 115 MMHG | OXYGEN SATURATION: 95 %

## 2021-07-12 DIAGNOSIS — E21.0 PRIMARY HYPERPARATHYROIDISM (HCC): ICD-10-CM

## 2021-07-12 DIAGNOSIS — E11.21 TYPE 2 DIABETES WITH NEPHROPATHY (HCC): ICD-10-CM

## 2021-07-12 DIAGNOSIS — Z13.31 SCREENING FOR DEPRESSION: ICD-10-CM

## 2021-07-12 DIAGNOSIS — Z13.39 SCREENING FOR ALCOHOLISM: ICD-10-CM

## 2021-07-12 DIAGNOSIS — E11.8 TYPE 2 DIABETES MELLITUS WITH COMPLICATION, WITHOUT LONG-TERM CURRENT USE OF INSULIN (HCC): ICD-10-CM

## 2021-07-12 DIAGNOSIS — Z00.00 MEDICARE ANNUAL WELLNESS VISIT, SUBSEQUENT: Primary | ICD-10-CM

## 2021-07-12 PROCEDURE — 3288F FALL RISK ASSESSMENT DOCD: CPT | Performed by: FAMILY MEDICINE

## 2021-07-12 PROCEDURE — G8752 SYS BP LESS 140: HCPCS | Performed by: FAMILY MEDICINE

## 2021-07-12 PROCEDURE — G8536 NO DOC ELDER MAL SCRN: HCPCS | Performed by: FAMILY MEDICINE

## 2021-07-12 PROCEDURE — G8419 CALC BMI OUT NRM PARAM NOF/U: HCPCS | Performed by: FAMILY MEDICINE

## 2021-07-12 PROCEDURE — G8754 DIAS BP LESS 90: HCPCS | Performed by: FAMILY MEDICINE

## 2021-07-12 PROCEDURE — 99214 OFFICE O/P EST MOD 30 MIN: CPT | Performed by: FAMILY MEDICINE

## 2021-07-12 PROCEDURE — G8427 DOCREV CUR MEDS BY ELIG CLIN: HCPCS | Performed by: FAMILY MEDICINE

## 2021-07-12 PROCEDURE — 1100F PTFALLS ASSESS-DOCD GE2>/YR: CPT | Performed by: FAMILY MEDICINE

## 2021-07-12 PROCEDURE — G8510 SCR DEP NEG, NO PLAN REQD: HCPCS | Performed by: FAMILY MEDICINE

## 2021-07-12 RX ORDER — GABAPENTIN 100 MG/1
100 CAPSULE ORAL
Qty: 90 CAPSULE | Refills: 1 | Status: SHIPPED | OUTPATIENT
Start: 2021-07-12 | End: 2022-01-17

## 2021-07-12 NOTE — PROGRESS NOTES
Chief Complaint   Patient presents with    Diabetes     check up    Medication Refill     gabapentin and glucose test strips and needles     1. Have you been to the ER, urgent care clinic since your last visit? Hospitalized since your last visit? No    2. Have you seen or consulted any other health care providers outside of the [address] [place] [address] since your last visit? Include any pap smears or colon screening. Yes When: pedotrist    Pain Carmela Barnhart is a 68 y.o. female     Subjective:   Diabetes (check up) and Medication Refill (gabapentin and glucose test strips and needles)    BP issues  Pt presents with above issue. Happened at home. No trauma, no overexertion. No recent travel. Diabetes. Sugars controlled well since last visit. Running mid 100's on on meter. Hypoglycemia: none  Tolerating current treatment well  Current medications include Metformin ER 2000mg every day and farxiga 5mg every day, changed last visit.     Lab Results   Component Value Date/Time    Hemoglobin A1c 5.9 (H) 01/15/2021 01:02 PM    Hemoglobin A1c 6.3 (H) 10/19/2020 03:16 PM    Hemoglobin A1c 6.9 (H) 07/20/2020 01:25 PM    Glucose 91 04/07/2021 02:37 PM    Microalbumin/Creat ratio (mg/g creat) 19 04/07/2021 02:37 PM    Microalbumin,urine random 0.55 04/07/2021 02:37 PM    Microalbumin/creat ratio (POC) <30 11/06/2017 12:00 PM    LDL,Direct 91 10/19/2020 03:16 PM    LDL, calculated 66 01/15/2021 01:02 PM    LDL, calculated Comment 02/10/2020 03:16 PM    Creatinine 0.98 04/07/2021 02:37 PM     Lab Results   Component Value Date/Time    Sodium 138 04/07/2021 02:37 PM    Potassium 4.7 04/07/2021 02:37 PM    Chloride 104 04/07/2021 02:37 PM    CO2 27 04/07/2021 02:37 PM    Anion gap 7 04/07/2021 02:37 PM    Glucose 91 04/07/2021 02:37 PM    BUN 31 (H) 04/07/2021 02:37 PM    Creatinine 0.98 04/07/2021 02:37 PM    BUN/Creatinine ratio 32 (H) 04/07/2021 02:37 PM    GFR est AA >60 04/07/2021 02:37 PM    GFR est non-AA 55 (L) 04/07/2021 02:37 PM    Calcium 11.5 (H) 04/07/2021 02:37 PM     Last eye exam performed 5/18, Dr. Yen January at [address] [place] [address]. Ofc, no DR per pt. Last foot exam 5/2018 with Podiatry in [address] [place] [address] Dr. Kenia Montilla, no abn. Hyperlipidemia. No problems with headaches since visit. Taking crestor 5mg 3x/ week. Daily doses have caused myalgias, so we are con't at [facility] for now. Lab Results   Component Value Date/Time    Cholesterol, total 156 01/15/2021 01:02 PM    HDL Cholesterol 40 01/15/2021 01:02 PM    LDL,Direct 91 10/19/2020 03:16 PM    LDL, calculated 66 01/15/2021 01:02 PM    LDL, calculated Comment 02/10/2020 03:16 PM    VLDL, calculated 50 (H) 01/15/2021 01:02 PM    VLDL, calculated Comment 02/10/2020 03:16 PM    Triglyceride 313 (H) 01/15/2021 01:02 PM     Lab Results   Component Value Date/Time    ALT (SGPT) 14 07/20/2020 01:25 PM    Alk. phosphatase 70 07/20/2020 01:25 PM    Bilirubin, total 0.3 07/20/2020 01:25 PM     PMH, SH, Medications/Allergies: reviewed, on chart. Current Outpatient Medications   Medication Sig    lisinopriL (PRINIVIL, ZESTRIL) 20 mg tablet TAKE 1 TABLET BY MOUTH EVERY DAY FOR PRESSURE AND HEART    metFORMIN ER (GLUCOPHAGE XR) 500 mg tablet TAKE 4 TABLETS BY MOUTH EVERY DAY FOR SUGAR    OneTouch Delica Plus Lancet 33 gauge misc USE TO CHECK SUGAR DAILY AS DIRECTED    OneTouch Ultra Blue Test Strip strip USE TO CHECK BLOOD SUGAR DAILY    gabapentin (NEURONTIN) 100 mg capsule Take 1 Cap by mouth nightly. Max Daily Amount: 100 mg. Indications: neuropathy    amLODIPine (NORVASC) 2.5 mg tablet Take 1 Tab by mouth daily as needed (high blood pressure over 150mmHg).  Indications: pressure    allopurinoL (ZYLOPRIM) 300 mg tablet TAKE 1 TABLET BY MOUTH ONCE DAILY FOR GOUT    dapagliflozin (Farxiga) 10 mg tab tablet Half pill daily  Indications: sugar    rosuvastatin (CRESTOR) 5 mg tablet 1 po QOD  Indications: heart and cholesterol    cyanocobalamin (VITAMIN B-12) 500 mcg tablet Take 500 mcg by mouth daily.  potassium 99 mg tablet Take 99 mg by mouth daily.  ascorbic acid, vitamin C, (VITAMIN C) 500 mg tablet Take 500 mg by mouth two (2) times a day.  aspirin delayed-release 81 mg tablet Take  by mouth daily.  magnesium oxide (MAG-OX) 400 mg tablet Take 400 mg by mouth daily.  naproxen sodium (ALEVE) 220 mg cap Take 3 Tabs by mouth daily. No current facility-administered medications for this visit. ROS:  Constitutional: No fever, chills or weight loss  Respiratory: No cough, SOB   CV: No chest pain or Palpitations    Visit Vitals  /60 (BP 1 Location: Left arm, BP Patient Position: Sitting, BP Cuff Size: Adult long)   Pulse 90   Temp 98.3 °F (36.8 °C) (Temporal)   Resp 22   Ht 5' (1.524 m)   Wt 138 lb 9.6 oz (62.9 kg)   SpO2 95%   BMI 27.07 kg/m²     Wt Readings from Last 3 Encounters:   07/12/21 138 lb 9.6 oz (62.9 kg)   04/07/21 137 lb 3.2 oz (62.2 kg)   10/19/20 145 lb (65.8 kg)   -7#  BP Readings from Last 3 Encounters:   07/12/21 115/60   04/07/21 106/62   10/19/20 122/62     Physical Examination: General appearance - alert, well appearing, and in no distress  Mental status - alert, oriented to person, place, and time  Eyes - pupils equal and reactive, extraocular eye movements intact  ENT - bilateral external ears and nose normal. Normal lips  Neck - supple, no significant adenopathy, no thyromegaly or mass  Lymphatics - no palpable lymphadenopathy, no hepatosplenomegaly  Chest - clear to auscultation, no wheezes, rales or rhonchi, symmetric air entry. Heart - normal rate, regular rhythm, normal S1, S2, no murmurs, rubs, clicks or gallops  Extremities - peripheral pulses normal, no pedal edema, no clubbing or cyanosis. Foot check: No calluses, no tinea. DP, PT pulses 2+ bilat. Monofilament test normal bilat. A/P:  DM2  Doing well with farxiga 5mg/d and metformin er 2g/d. Con't. Did well with gabapentin 100mg qhs.  Plan bump to 300mg if not getting good relief. Lipids  Norman the crestor 5mg TIW ok. Spring lipid check good. Have not had a lot of success with tolerating dose above that. Recheck due ~2022    HTN  Well controlled. con't plain lisinopril 20mg every day. Recheck labs. Hypercalcemia  C/W primary hyperPTH, with occ BP spikes. Con't amlodipine 2.5mg daily PRN high blood pressure >150mmHg, Recheck labs. Avoid thiazides for now. Lab Results   Component Value Date/Time    Calcium 11.5 (H) 2021 02:37 PM    Phosphorus 3.0 2019 11:00 AM    PTH, Intact 37 2019 11:00 AM     F/U 6mo for routine visit, labs visit soon. 1. Have you been to the ER, urgent care clinic since your last visit? Hospitalized since your last visit? No    2. Have you seen or consulted any other health care providers outside of the [address] [place] [address] since your last visit? Include any pap smears or colon screening. No    Identified pt with two pt identifiers(name and ). Reviewed record in preparation for visit and have obtained necessary documentation. 3 most recent PHQ Screens 2021   PHQ Not Done -   Little interest or pleasure in doing things Not at all   Feeling down, depressed, irritable, or hopeless Not at all   Total Score PHQ 2 0     Learning Assessment 2021   PRIMARY LEARNER Patient   PRIMARY LANGUAGE ENGLISH   LEARNER PREFERENCE PRIMARY READING     LISTENING   ANSWERED BY patient   RELATIONSHIP SELF     Fall Risk Assessment, last 12 mths 2021   Able to walk? Yes   Fall in past 12 months? 1   Do you feel unsteady? 0   Are you worried about falling 0   Is TUG test greater than 12 seconds? 0   Is the gait abnormal? 0   Number of falls in past 12 months 1   Fall with injury? 0     Abuse Screening Questionnaire 2021   Do you ever feel afraid of your partner? N   Are you in a relationship with someone who physically or mentally threatens you? N   Is it safe for you to go home?  Y     ADL Assessment 2021   Feeding yourself No Help Needed   Getting from bed to chair No Help Needed   Getting dressed No Help Needed   Bathing or showering No Help Needed   Walk across the room (includes cane/walker) No Help Needed   Using the telphone No Help Needed   Taking your medications No Help Needed   Preparing meals No Help Needed   Managing money (expenses/bills) No Help Needed   Moderately strenuous housework (laundry) No Help Needed   Shopping for personal items (toiletries/medicines) No Help Needed   Shopping for groceries No Help Needed   Driving No Help Needed   Climbing a flight of stairs No Help Needed   Getting to places beyond walking distances No Help Needed     1. Have you been to the ER, urgent care clinic since your last visit? Hospitalized since your last visit? No    2. Have you seen or consulted any other health care providers outside of the [address] [place] [address] [place] since your last visit? Include any pap smears or colon screening.  No      Pain Scale: 0 - No pain/10  Pain Location:     Mitul Dempsey is a 68 y.o. female presenting for/with:    Diabetes (check up) and Medication Refill (gabapentin and glucose test strips and needles)      Symptom review:    NO  Fever   NO  Shaking chills  NO  Cough  NO  Body aches  NO  Coughing up blood  NO  Chest congestion  NO  Chest pain  NO  Shortness of breath  NO  Profound Loss of smell/taste  NO  Nausea/Vomiting   NO  Loose stool/Diarrhea  NO  any skin issues    Patient Risk Factors Reviewed as follows:  NO  have you been in Close contact with confirmed COVID19 patient   NO  History of recent travel to affected geographical areas within the past 14 days  NO  COPD  NO  Active Cancer/Leukemia/Lymphoma/Chemotherapy  NO  Oral steroid use  NO  Pregnant  NO  Diabetes Mellitus  NO  Heart disease  NO  Asthma  NO Health care worker at home  NO Health care worker  NO Is there a Pregnant Woman in the home  NO Dialysis pt in the home   NO a large number of people living in the home    Recent Travel Screening and Travel History documentation     Travel Screening     Question   Response    In the last month, have you been in contact with someone who was confirmed or suspected to have Coronavirus / COVID-19? No / Unsure    Have you had a COVID-19 viral test in the last 14 days? No    Do you have any of the following new or worsening symptoms? None of these    Have you traveled internationally or domestically in the last month?   No      Travel History   Travel since 06/12/21     No documented travel since 06/12/21

## 2021-07-13 LAB
ANION GAP SERPL CALC-SCNC: 9 MMOL/L (ref 5–15)
BUN SERPL-MCNC: 37 MG/DL (ref 6–20)
BUN/CREAT SERPL: 39 (ref 12–20)
CALCIUM SERPL-MCNC: 11.5 MG/DL (ref 8.5–10.1)
CHLORIDE SERPL-SCNC: 106 MMOL/L (ref 97–108)
CO2 SERPL-SCNC: 26 MMOL/L (ref 21–32)
CREAT SERPL-MCNC: 0.96 MG/DL (ref 0.55–1.02)
EST. AVERAGE GLUCOSE BLD GHB EST-MCNC: 128 MG/DL
GLUCOSE SERPL-MCNC: 177 MG/DL (ref 65–100)
HBA1C MFR BLD: 6.1 % (ref 4–5.6)
POTASSIUM SERPL-SCNC: 4.6 MMOL/L (ref 3.5–5.1)
SODIUM SERPL-SCNC: 141 MMOL/L (ref 136–145)

## 2021-10-13 ENCOUNTER — OFFICE VISIT (OUTPATIENT)
Dept: FAMILY MEDICINE CLINIC | Age: 81
End: 2021-10-13
Payer: MEDICARE

## 2021-10-13 VITALS
BODY MASS INDEX: 28.71 KG/M2 | SYSTOLIC BLOOD PRESSURE: 120 MMHG | TEMPERATURE: 97.7 F | DIASTOLIC BLOOD PRESSURE: 76 MMHG | WEIGHT: 147 LBS | HEART RATE: 89 BPM

## 2021-10-13 DIAGNOSIS — Z29.8 NEED FOR MALARIA PROPHYLAXIS: ICD-10-CM

## 2021-10-13 DIAGNOSIS — Z71.84 TRAVEL ADVICE ENCOUNTER: ICD-10-CM

## 2021-10-13 DIAGNOSIS — I10 PRIMARY HYPERTENSION: ICD-10-CM

## 2021-10-13 DIAGNOSIS — E78.00 PURE HYPERCHOLESTEROLEMIA: ICD-10-CM

## 2021-10-13 DIAGNOSIS — E11.9 TYPE 2 DIABETES MELLITUS WITHOUT COMPLICATION, WITHOUT LONG-TERM CURRENT USE OF INSULIN (HCC): Primary | ICD-10-CM

## 2021-10-13 DIAGNOSIS — E11.21 TYPE 2 DIABETES WITH NEPHROPATHY (HCC): ICD-10-CM

## 2021-10-13 DIAGNOSIS — E21.0 PRIMARY HYPERPARATHYROIDISM (HCC): ICD-10-CM

## 2021-10-13 LAB
ALBUMIN SERPL-MCNC: 5 G/DL (ref 3.5–5)
ALBUMIN/GLOB SERPL: 1.6 {RATIO} (ref 1.1–2.2)
ALP SERPL-CCNC: 76 U/L (ref 45–117)
ALT SERPL-CCNC: 28 U/L (ref 12–78)
ANION GAP SERPL CALC-SCNC: 6 MMOL/L (ref 5–15)
AST SERPL-CCNC: 20 U/L (ref 15–37)
BILIRUB SERPL-MCNC: 0.6 MG/DL (ref 0.2–1)
BUN SERPL-MCNC: 30 MG/DL (ref 6–20)
BUN/CREAT SERPL: 28 (ref 12–20)
CALCIUM SERPL-MCNC: 11.8 MG/DL (ref 8.5–10.1)
CHLORIDE SERPL-SCNC: 104 MMOL/L (ref 97–108)
CO2 SERPL-SCNC: 26 MMOL/L (ref 21–32)
CREAT SERPL-MCNC: 1.06 MG/DL (ref 0.55–1.02)
EST. AVERAGE GLUCOSE BLD GHB EST-MCNC: 140 MG/DL
GLOBULIN SER CALC-MCNC: 3.1 G/DL (ref 2–4)
GLUCOSE SERPL-MCNC: 88 MG/DL (ref 65–100)
HBA1C MFR BLD: 6.5 % (ref 4–5.6)
POTASSIUM SERPL-SCNC: 4.4 MMOL/L (ref 3.5–5.1)
PROT SERPL-MCNC: 8.1 G/DL (ref 6.4–8.2)
SODIUM SERPL-SCNC: 136 MMOL/L (ref 136–145)

## 2021-10-13 PROCEDURE — 99214 OFFICE O/P EST MOD 30 MIN: CPT | Performed by: FAMILY MEDICINE

## 2021-10-13 RX ORDER — ATOVAQUONE AND PROGUANIL HYDROCHLORIDE 250; 100 MG/1; MG/1
1 TABLET, FILM COATED ORAL DAILY
Qty: 12 TABLET | Refills: 0 | Status: SHIPPED | OUTPATIENT
Start: 2021-10-13 | End: 2022-06-28 | Stop reason: ALTCHOICE

## 2021-10-13 NOTE — PROGRESS NOTES
Chief Complaint   Patient presents with    Medication Evaluation     travel medications     1. Have you been to the ER, urgent care clinic since your last visit? Hospitalized since your last visit? No    2. Have you seen or consulted any other health care providers outside of the [address] [place] [address] [place] since your last visit? Include any pap smears or colon screening. Yes When: pedotrist    Pain 0. Sommer Kate is a 80 y.o. female     Subjective:   Medication Evaluation (travel medications)    BP issues  Pt presents with above issue. Happened at home. No trauma, no overexertion. No recent travel. Diabetes. Sugars controlled so/so since last visit. Running mid 100's on on meter. Hypoglycemia: none  Tolerating current treatment well  Current medications include Metformin ER 2000mg every day and farxiga 5mg every day.     Lab Results   Component Value Date/Time    Hemoglobin A1c 6.1 (H) 07/12/2021 02:33 PM    Hemoglobin A1c 5.9 (H) 01/15/2021 01:02 PM    Hemoglobin A1c 6.3 (H) 10/19/2020 03:16 PM    Glucose 177 (H) 07/12/2021 02:33 PM    Microalbumin/Creat ratio (mg/g creat) 19 04/07/2021 02:37 PM    Microalbumin,urine random 0.55 04/07/2021 02:37 PM    Microalbumin/creat ratio (POC) <30 11/06/2017 12:00 PM    LDL,Direct 91 10/19/2020 03:16 PM    LDL, calculated 66 01/15/2021 01:02 PM    LDL, calculated Comment 02/10/2020 03:16 PM    Creatinine 0.96 07/12/2021 02:33 PM     Lab Results   Component Value Date/Time    Sodium 141 07/12/2021 02:33 PM    Potassium 4.6 07/12/2021 02:33 PM    Chloride 106 07/12/2021 02:33 PM    CO2 26 07/12/2021 02:33 PM    Anion gap 9 07/12/2021 02:33 PM    Glucose 177 (H) 07/12/2021 02:33 PM    BUN 37 (H) 07/12/2021 02:33 PM    Creatinine 0.96 07/12/2021 02:33 PM    BUN/Creatinine ratio 39 (H) 07/12/2021 02:33 PM    GFR est AA >60 07/12/2021 02:33 PM    GFR est non-AA 56 (L) 07/12/2021 02:33 PM    Calcium 11.5 (H) 07/12/2021 02:33 PM     Last eye exam performed 5/18, Dr. Vel Whitaker at [address] Ofc, no DR per pt. Last foot exam 5/2018 with Podiatry in [address] [place] [address] Dr. Maximo Figueroa, no abn. Hyperlipidemia. No problems with headaches since visit. Taking crestor 5mg 3x/ week. Daily doses have caused myalgias, so we are con't at [facility] for now. Lab Results   Component Value Date/Time    Cholesterol, total 156 01/15/2021 01:02 PM    HDL Cholesterol 40 01/15/2021 01:02 PM    LDL,Direct 91 10/19/2020 03:16 PM    LDL, calculated 66 01/15/2021 01:02 PM    LDL, calculated Comment 02/10/2020 03:16 PM    VLDL, calculated 50 (H) 01/15/2021 01:02 PM    VLDL, calculated Comment 02/10/2020 03:16 PM    Triglyceride 313 (H) 01/15/2021 01:02 PM     Lab Results   Component Value Date/Time    ALT (SGPT) 14 07/20/2020 01:25 PM    Alk. phosphatase 70 07/20/2020 01:25 PM    Bilirubin, total 0.3 07/20/2020 01:25 PM     PMH, SH, Medications/Allergies: reviewed, on chart. Current Outpatient Medications   Medication Sig    dapagliflozin (Farxiga) 10 mg tab tablet Half pill daily  Indications: sugar    gabapentin (NEURONTIN) 100 mg capsule Take 1 Capsule by mouth nightly. Max Daily Amount: 100 mg. Indications: neuropathy    lisinopriL (PRINIVIL, ZESTRIL) 20 mg tablet TAKE 1 TABLET BY MOUTH EVERY DAY FOR PRESSURE AND HEART    metFORMIN ER (GLUCOPHAGE XR) 500 mg tablet TAKE 4 TABLETS BY MOUTH EVERY DAY FOR SUGAR    OneTouch Delica Plus Lancet 33 gauge misc USE TO CHECK SUGAR DAILY AS DIRECTED    OneTouch Ultra Blue Test Strip strip USE TO CHECK BLOOD SUGAR DAILY    amLODIPine (NORVASC) 2.5 mg tablet Take 1 Tab by mouth daily as needed (high blood pressure over 150mmHg). Indications: pressure    allopurinoL (ZYLOPRIM) 300 mg tablet TAKE 1 TABLET BY MOUTH ONCE DAILY FOR GOUT    rosuvastatin (CRESTOR) 5 mg tablet 1 po QOD  Indications: heart and cholesterol    cyanocobalamin (VITAMIN B-12) 500 mcg tablet Take 500 mcg by mouth daily.  potassium 99 mg tablet Take 99 mg by mouth daily.     ascorbic acid, vitamin C, (VITAMIN C) 500 mg tablet Take 500 mg by mouth two (2) times a day.  aspirin delayed-release 81 mg tablet Take  by mouth daily.  magnesium oxide (MAG-OX) 400 mg tablet Take 400 mg by mouth daily.  naproxen sodium (ALEVE) 220 mg cap Take 3 Tabs by mouth daily. No current facility-administered medications for this visit. ROS:  Constitutional: No fever, chills or weight loss  Respiratory: No cough, SOB   CV: No chest pain or Palpitations    Visit Vitals  /76 (BP 1 Location: Left arm)   Pulse 89   Temp 97.7 °F (36.5 °C) (Temporal)   Wt 147 lb (66.7 kg)   BMI 28.71 kg/m²     Wt Readings from Last 3 Encounters:   10/13/21 147 lb (66.7 kg)   07/12/21 138 lb 9.6 oz (62.9 kg)   04/07/21 137 lb 3.2 oz (62.2 kg)   +9#  BP Readings from Last 3 Encounters:   10/13/21 120/76   07/12/21 115/60   04/07/21 106/62     Physical Examination: General appearance - alert, well appearing, and in no distress  Mental status - alert, oriented to person, place, and time  Eyes - pupils equal and reactive, extraocular eye movements intact  ENT - bilateral external ears and nose normal. Normal lips  Neck - supple, no significant adenopathy, no thyromegaly or mass  Lymphatics - no palpable lymphadenopathy, no hepatosplenomegaly  Chest - clear to auscultation, no wheezes, rales or rhonchi, symmetric air entry. Heart - normal rate, regular rhythm, normal S1, S2, no murmurs, rubs, clicks or gallops  Extremities - peripheral pulses normal, no pedal edema, no clubbing or cyanosis. Foot check: No calluses, no tinea. DP, PT pulses 2+ bilat. Monofilament test normal bilat. A/P:  DM2  Doing well with farxiga 5mg/d and metformin er 2g/d. Con't. Did well with gabapentin 100mg qhs. Plan bump to 300mg if not getting good relief. Check A1c, CMP, adj PRN. Lipids  Norman the crestor 5mg TIW ok. Spring lipid check good. Have not had a lot of success with tolerating dose above that.  Recheck due ~1/2022    HTN  Well controlled. con't plain lisinopril 20mg every day. Recheck labs. Hypercalcemia  C/W primary hyperPTH, with occ BP spikes. Con't amlodipine 2.5mg daily PRN high blood pressure >150mmHg, Recheck labs. Avoid thiazides for now. Lab Results   Component Value Date/Time    Calcium 11.5 (H) 07/12/2021 02:33 PM    Phosphorus 3.0 01/28/2019 11:00 AM    PTH, Intact 37 01/28/2019 11:00 AM     PT with L posterior shoulder pain  Started 15y ago after heavy lifting, has been bothering her off and on ever since. More bothersome since carrying heavy luggage earlier this year. Brief exam shows impingement signs. Consider injection, try home PT for now. Travel consult  Headed to [place] to do an [place] tour  Malarone 250/100 1 daily     F/U 6mo for routine visit    1. Have you been to the ER, urgent care clinic since your last visit? Hospitalized since your last visit? No    2. Have you seen or consulted any other health care providers outside of the [address] [place] [address] since your last visit? Include any pap smears or colon screening.  No

## 2021-10-13 NOTE — PATIENT INSTRUCTIONS
Rotator Cuff: Exercises  IntroductionScapular exercise: Retraction    For this exercise, you will need elastic exercise material, such as surgical tubing or Thera-Band. 1. Put the band around a solid object at about waist level. (A bedpost will work well.) Each hand should hold an end of the band. 2. With your elbows at your sides and bent to 90 degrees, pull the band back. Your shoulder blades should move toward each other. Then move your arms back where you started. 3. Repeat 8 to 12 times. 4. If you have good range of motion in your shoulders, try this exercise with your arms lifted out to the sides. Keep your elbows at a 90-degree angle. Raise the elastic band up to about shoulder level. Pull the band back to move your shoulder blades toward each other. Then move your arms back where you started. Internal rotator strengthening exercise    1. Start by tying a piece of elastic exercise material to a doorknob. You can use surgical tubing or Thera-Band. 2. Stand or sit with your shoulder relaxed and your elbow bent 90 degrees. Your upper arm should rest comfortably against your side. Squeeze a rolled towel between your elbow and your body for comfort. This will help keep your arm at your side. 3. Hold one end of the elastic band in the hand of the painful arm. 4. Slowly rotate your forearm toward your body until it touches your belly. Slowly move it back to where you started. 5. Keep your elbow and upper arm firmly tucked against the towel roll or at your side. 6. Repeat 8 to 12 times. External rotator strengthening exercise    1. Start by tying a piece of elastic exercise material to a doorknob. You can use surgical tubing or Thera-Band. (You may also hold one end of the band in each hand.)  2. Stand or sit with your shoulder relaxed and your elbow bent 90 degrees. Your upper arm should rest comfortably against your side. Squeeze a rolled towel between your elbow and your body for comfort.  This will help keep your arm at your side. 3. Hold one end of the elastic band with the hand of the painful arm. 4. Start with your forearm across your belly. Slowly rotate the forearm out away from your body. Keep your elbow and upper arm tucked against the towel roll or the side of your body until you begin to feel tightness in your shoulder. Slowly move your arm back to where you started. 5. Repeat 8 to 12 times. Follow-up care is a key part of your treatment and safety. Be sure to make and go to all appointments, and call your doctor if you are having problems. It's also a good idea to know your test results and keep a list of the medicines you take. Where can you learn more? Go to http://www.gray.com/  Enter J005 in the search box to learn more about \"Rotator Cuff: Exercises. \"  Current as of: July 1, 2021               Content Version: 13.0  © 4009-8941 [employer]. Care instructions adapted under license by [employer] (which disclaims liability or warranty for this information). If you have questions about a medical condition or this instruction, always ask your healthcare professional. Tari Murguia any warranty or liability for your use of this information.

## 2022-01-17 DIAGNOSIS — E11.21 TYPE 2 DIABETES WITH NEPHROPATHY (HCC): ICD-10-CM

## 2022-01-17 RX ORDER — GABAPENTIN 100 MG/1
CAPSULE ORAL
Qty: 90 CAPSULE | Refills: 1 | Status: SHIPPED | OUTPATIENT
Start: 2022-01-17 | End: 2022-07-11

## 2022-03-19 PROBLEM — E21.0 PRIMARY HYPERPARATHYROIDISM (HCC): Status: ACTIVE | Noted: 2019-05-20

## 2022-03-19 PROBLEM — E11.21 TYPE 2 DIABETES WITH NEPHROPATHY (HCC): Status: ACTIVE | Noted: 2018-05-14

## 2022-03-30 ENCOUNTER — OFFICE VISIT (OUTPATIENT)
Dept: FAMILY MEDICINE CLINIC | Age: 82
End: 2022-03-30
Payer: MEDICARE

## 2022-03-30 VITALS
WEIGHT: 153.13 LBS | TEMPERATURE: 97.4 F | SYSTOLIC BLOOD PRESSURE: 140 MMHG | BODY MASS INDEX: 28.91 KG/M2 | DIASTOLIC BLOOD PRESSURE: 65 MMHG | HEIGHT: 61 IN | RESPIRATION RATE: 18 BRPM | OXYGEN SATURATION: 97 % | HEART RATE: 78 BPM

## 2022-03-30 DIAGNOSIS — Z00.00 MEDICARE ANNUAL WELLNESS VISIT, SUBSEQUENT: Primary | ICD-10-CM

## 2022-03-30 DIAGNOSIS — Z13.31 SCREENING FOR DEPRESSION: ICD-10-CM

## 2022-03-30 DIAGNOSIS — E78.00 PURE HYPERCHOLESTEROLEMIA: ICD-10-CM

## 2022-03-30 DIAGNOSIS — Z13.39 SCREENING FOR ALCOHOLISM: ICD-10-CM

## 2022-03-30 DIAGNOSIS — I10 PRIMARY HYPERTENSION: ICD-10-CM

## 2022-03-30 DIAGNOSIS — E11.8 TYPE 2 DIABETES MELLITUS WITH COMPLICATION, WITHOUT LONG-TERM CURRENT USE OF INSULIN (HCC): ICD-10-CM

## 2022-03-30 DIAGNOSIS — E21.0 PRIMARY HYPERPARATHYROIDISM (HCC): ICD-10-CM

## 2022-03-30 PROCEDURE — 99214 OFFICE O/P EST MOD 30 MIN: CPT | Performed by: FAMILY MEDICINE

## 2022-03-30 PROCEDURE — G0439 PPPS, SUBSEQ VISIT: HCPCS | Performed by: FAMILY MEDICINE

## 2022-03-30 NOTE — PROGRESS NOTES
Yovani Heaton is a 80 y.o. female presenting for/with:    Chief Complaint   Patient presents with    Hypertension    Diabetes    Annual Wellness Visit       Visit Vitals  BP (!) 140/65 (BP 1 Location: Right arm)   Pulse 78   Temp 97.4 °F (36.3 °C) (Temporal)   Resp 18   Ht 5' 1\" (1.549 m)   Wt 153 lb 2 oz (69.5 kg)   SpO2 97%   BMI 28.93 kg/m²     Pain Scale: 0 - No pain/10  Pain Location:     1. Have you been to the ER, urgent care clinic since your last visit? Hospitalized since your last visit? NO    2. Have you seen or consulted any other health care providers outside of the [address] [place] [address] since your last visit? Include any pap smears or colon screening. NO    Symptom review:  NO  Fever   NO  Shaking chills  NO  Cough  NO  Body aches  NO  Coughing up blood  NO  Chest congestion  NO  Chest pain  NO  Shortness of breath  NO  Profound Loss of smell/taste  NO  Nausea/Vomiting   NO  Loose stool/Diarrhea  NO  any skin issues    Patient Risk Factors Reviewed as follows:  NO  have you been in Close contact with confirmed COVID19 patient   NO  History of recent travel to affected geographical areas within the past 14 days  NO  COPD  NO  Active Cancer/Leukemia/Lymphoma/Chemotherapy  NO  Oral steroid use  NO  Pregnant  YES  Diabetes Mellitus  NO  Heart disease  NO  Asthma  NO Health care worker at home  [id number] care worker  NO Is there a Pregnant Woman in the home  NO Dialysis pt in the home   NO a large number of people living in the home    Learning Assessment 10/13/2021   PRIMARY LEARNER Patient   PRIMARY LANGUAGE ENGLISH   LEARNER PREFERENCE PRIMARY READING     -   ANSWERED BY patient   RELATIONSHIP SELF     Fall Risk Assessment, last 12 mths 3/30/2022   Able to walk? Yes   Fall in past 12 months? 0   Do you feel unsteady? 0   Are you worried about falling 0   Is TUG test greater than 12 seconds? -   Is the gait abnormal? -   Number of falls in past 12 months -   Fall with injury?  -       3 most recent PHQ Screens 3/30/2022   PHQ Not Done -   Little interest or pleasure in doing things Several days   Feeling down, depressed, irritable, or hopeless Several days   Total Score PHQ 2 2     Abuse Screening Questionnaire 3/30/2022   Do you ever feel afraid of your partner? N   Are you in a relationship with someone who physically or mentally threatens you? N   Is it safe for you to go home? Y       ADL Assessment 3/30/2022   Feeding yourself No Help Needed   Getting from bed to chair No Help Needed   Getting dressed No Help Needed   Bathing or showering No Help Needed   Walk across the room (includes cane/walker) No Help Needed   Using the telphone No Help Needed   Taking your medications No Help Needed   Preparing meals No Help Needed   Managing money (expenses/bills) No Help Needed   Moderately strenuous housework (laundry) No Help Needed   Shopping for personal items (toiletries/medicines) No Help Needed   Shopping for groceries No Help Needed   Driving No Help Needed   Climbing a flight of stairs No Help Needed   Getting to places beyond walking distances No Help Needed      Advanced directives on file. Verified emergency contact. This is the Subsequent Medicare Annual Wellness Exam, performed 12 months or more after the Initial AWV or the last Subsequent AWV    I have reviewed the patient's medical history in detail and updated the computerized patient record. Assessment/Plan   Education and counseling provided:  Are appropriate based on today's review and evaluation    1. Medicare annual wellness visit, subsequent  2. Screening for alcoholism  3.  Screening for depression  -     DEPRESSION SCREEN ANNUAL       Depression Risk Factor Screening     3 most recent PHQ Screens 3/30/2022   PHQ Not Done -   Little interest or pleasure in doing things Several days   Feeling down, depressed, irritable, or hopeless Several days   Total Score PHQ 2 2       Alcohol & Drug Abuse Risk Screen    Do you average more than 1 drink per night or more than 7 drinks a week:  No    On any one occasion in the past three months have you have had more than 3 drinks containing alcohol:  No          Functional Ability and Level of Safety    Hearing: Hearing is good. Activities of Daily Living: The home contains: no safety equipment. Patient does total self care      Ambulation: with no difficulty     Fall Risk:  Fall Risk Assessment, last 12 mths 3/30/2022   Able to walk? Yes   Fall in past 12 months? 0   Do you feel unsteady? 0   Are you worried about falling 0   Is TUG test greater than 12 seconds? -   Is the gait abnormal? -   Number of falls in past 12 months -   Fall with injury?  -      Abuse Screen:  Patient is not abused       Cognitive Screening    Has your family/caregiver stated any concerns about your memory: no         Health Maintenance Due     Health Maintenance Due   Topic Date Due    Eye Exam Retinal or Dilated  05/07/2020    Lipid Screen  01/15/2022    MICROALBUMIN Q1  04/07/2022    A1C test (Diabetic or Prediabetic)  04/13/2022       Patient Care Team   Patient Care Team:  Debora Parikh MD as PCP - General (Family Medicine)  Debora Parikh MD as PCP - [facility] Empaneled Provider    History     Patient Active Problem List   Diagnosis Code    S/P TKR (total knee replacement) Z96.659    Hyperlipidemia E78.5    OP (osteoporosis) M81.0    Hypercalcemia E83.52    CTS (carpal tunnel syndrome) G56.00    Gout M10.9    DM2 (diabetes mellitus, type 2) ([address] E11.9    HTN (hypertension) I10    History of colonoscopy Z98.890    Advance directive discussed with patient Z71.89    Type 2 diabetes with nephropathy ([address] E11.21    Primary hyperparathyroidism ([address] E21.0     Past Medical History:   Diagnosis Date    Diabetes ([address]     Endometriosis     Hypercholesterolemia     Hypertension     Menopause     Osteoporosis     Urethral stricture       Past Surgical History:   Procedure Laterality Date  HX HYSTERECTOMY  1999    HX KNEE REPLACEMENT      right and left    HX WISDOM TEETH EXTRACTION  1985     Current Outpatient Medications   Medication Sig Dispense Refill    gabapentin (NEURONTIN) 100 mg capsule TAKE 1 CAPSULE BY MOUTH EVERY NIGHT. MAX DAILY AMOUNT: 100 MG. INDICATIONS: NERVE PAIN 90 Capsule 1    allopurinoL (ZYLOPRIM) 300 mg tablet TAKE 1 TABLET BY MOUTH ONCE DAILY FOR GOUT 90 Tablet 3    rosuvastatin (CRESTOR) 5 mg tablet TAKE 1 TABLET BY MOUTH EVERY OTHER DAY 45 Tablet 3    atovaquone-proguaniL (MALARONE) 250-100 mg per tablet Take 1 Tablet by mouth daily. Indications: 1 day before and 7 days after malaria exposure 12 Tablet 0    dapagliflozin (Farxiga) 10 mg tab tablet Half pill daily  Indications: sugar 90 Tablet 1    lisinopriL (PRINIVIL, ZESTRIL) 20 mg tablet TAKE 1 TABLET BY MOUTH EVERY DAY FOR PRESSURE AND HEART 90 Tab 3    metFORMIN ER (GLUCOPHAGE XR) 500 mg tablet TAKE 4 TABLETS BY MOUTH EVERY DAY FOR SUGAR 360 Tab 3    OneTouch Delica Plus Lancet 33 gauge misc USE TO CHECK SUGAR DAILY AS DIRECTED 100 Lancet 3    OneTouch Ultra Blue Test Strip strip USE TO CHECK BLOOD SUGAR DAILY 100 Strip 3    amLODIPine (NORVASC) 2.5 mg tablet Take 1 Tab by mouth daily as needed (high blood pressure over 150mmHg). Indications: pressure 30 Tab 11    cyanocobalamin (VITAMIN B-12) 500 mcg tablet Take 500 mcg by mouth daily.  potassium 99 mg tablet Take 99 mg by mouth daily.  ascorbic acid, vitamin C, (VITAMIN C) 500 mg tablet Take 500 mg by mouth two (2) times a day.  aspirin delayed-release 81 mg tablet Take  by mouth daily.  magnesium oxide (MAG-OX) 400 mg tablet Take 400 mg by mouth daily.  naproxen sodium (ALEVE) 220 mg cap Take 3 Tabs by mouth daily.        Allergies   Allergen Reactions    Streptomycin Unknown (comments)       Family History   Problem Relation Age of Onset    Diabetes Father     Heart Disease Father     Cancer Father         colon    Other Father         Renal stones    No Known Problems Mother     Other Brother         Renal stones    Diabetes Brother     Arthritis-rheumatoid Brother     Other Brother         Renal Stones    Heart Disease Brother     Cancer Maternal Grandfather         Unknown     Social History     Tobacco Use    Smoking status: Never Smoker    Smokeless tobacco: Never Used   Substance Use Topics    Alcohol use:  Yes     Alcohol/week: 2.0 standard drinks     Types: 2 Shots of liquor per week         Dulce Maria Taylor

## 2022-03-30 NOTE — PROGRESS NOTES
Chief Complaint   Patient presents with    Hypertension    Diabetes    Annual Wellness Visit     1. Have you been to the ER, urgent care clinic since your last visit? Hospitalized since your last visit? No    2. Have you seen or consulted any other health care providers outside of the [address] [place] [address] since your last visit? Include any pap smears or colon screening. Yes When: pedotrist    Pain 0Jose Rangel Do is a 80 y.o. female     Subjective:   Hypertension, Diabetes, and Annual Wellness Visit    Diabetes. Sugars controlled so/so since last visit. Running mid to high 100's on on meter. Hypoglycemia: none  Tolerating current treatment well  Current medications include Metformin ER 2000mg every day and farxiga 5mg every day, didn't improve with trial of 10mg/d of that. Lab Results   Component Value Date/Time    Hemoglobin A1c 6.5 (H) 10/13/2021 02:56 PM    Hemoglobin A1c 6.1 (H) 07/12/2021 02:33 PM    Hemoglobin A1c 5.9 (H) 01/15/2021 01:02 PM    Glucose 88 10/13/2021 02:56 PM    Microalbumin/Creat ratio (mg/g creat) 19 04/07/2021 02:37 PM    Microalbumin,urine random 0.55 04/07/2021 02:37 PM    Microalbumin/creat ratio (POC) <30 11/06/2017 12:00 PM    LDL,Direct 91 10/19/2020 03:16 PM    LDL, calculated 66 01/15/2021 01:02 PM    LDL, calculated Comment 02/10/2020 03:16 PM    Creatinine 1.06 (H) 10/13/2021 02:56 PM     Lab Results   Component Value Date/Time    Sodium 136 10/13/2021 02:56 PM    Potassium 4.4 10/13/2021 02:56 PM    Chloride 104 10/13/2021 02:56 PM    CO2 26 10/13/2021 02:56 PM    Anion gap 6 10/13/2021 02:56 PM    Glucose 88 10/13/2021 02:56 PM    BUN 30 (H) 10/13/2021 02:56 PM    Creatinine 1.06 (H) 10/13/2021 02:56 PM    BUN/Creatinine ratio 28 (H) 10/13/2021 02:56 PM    GFR est AA >60 10/13/2021 02:56 PM    GFR est non-AA 50 (L) 10/13/2021 02:56 PM    Calcium 11.8 (H) 10/13/2021 02:56 PM     Last eye exam performed 5/18, Dr. Omer Garibay at [address] [place] [address].  Ofc, no DR per pt.  Last foot exam 5/2018 with Podiatry in [address] [place] [address] Dr. Ravi Sykes, no abn. Hyperlipidemia. No problems with headaches since visit. Taking crestor 5mg 3x/ week. Daily doses have caused myalgias, so we are con't at [facility] for now. Lab Results   Component Value Date/Time    Cholesterol, total 156 01/15/2021 01:02 PM    HDL Cholesterol 40 01/15/2021 01:02 PM    LDL,Direct 91 10/19/2020 03:16 PM    LDL, calculated 66 01/15/2021 01:02 PM    LDL, calculated Comment 02/10/2020 03:16 PM    VLDL, calculated 50 (H) 01/15/2021 01:02 PM    VLDL, calculated Comment 02/10/2020 03:16 PM    Triglyceride 313 (H) 01/15/2021 01:02 PM     Lab Results   Component Value Date/Time    ALT (SGPT) 28 10/13/2021 02:56 PM    Alk. phosphatase 76 10/13/2021 02:56 PM    Bilirubin, total 0.6 10/13/2021 02:56 PM     PMH, SH, Medications/Allergies: reviewed, on chart. Current Outpatient Medications   Medication Sig    gabapentin (NEURONTIN) 100 mg capsule TAKE 1 CAPSULE BY MOUTH EVERY NIGHT. MAX DAILY AMOUNT: 100 MG. INDICATIONS: NERVE PAIN    allopurinoL (ZYLOPRIM) 300 mg tablet TAKE 1 TABLET BY MOUTH ONCE DAILY FOR GOUT    rosuvastatin (CRESTOR) 5 mg tablet TAKE 1 TABLET BY MOUTH EVERY OTHER DAY    atovaquone-proguaniL (MALARONE) 250-100 mg per tablet Take 1 Tablet by mouth daily. Indications: 1 day before and 7 days after malaria exposure    dapagliflozin (Farxiga) 10 mg tab tablet Half pill daily  Indications: sugar    lisinopriL (PRINIVIL, ZESTRIL) 20 mg tablet TAKE 1 TABLET BY MOUTH EVERY DAY FOR PRESSURE AND HEART    metFORMIN ER (GLUCOPHAGE XR) 500 mg tablet TAKE 4 TABLETS BY MOUTH EVERY DAY FOR SUGAR    OneTouch Delica Plus Lancet 33 gauge misc USE TO CHECK SUGAR DAILY AS DIRECTED    OneTouch Ultra Blue Test Strip strip USE TO CHECK BLOOD SUGAR DAILY    amLODIPine (NORVASC) 2.5 mg tablet Take 1 Tab by mouth daily as needed (high blood pressure over 150mmHg).  Indications: pressure    cyanocobalamin (VITAMIN B-12) 500 mcg tablet Take 500 mcg by mouth daily.  potassium 99 mg tablet Take 99 mg by mouth daily.  ascorbic acid, vitamin C, (VITAMIN C) 500 mg tablet Take 500 mg by mouth two (2) times a day.  aspirin delayed-release 81 mg tablet Take  by mouth daily.  magnesium oxide (MAG-OX) 400 mg tablet Take 400 mg by mouth daily.  naproxen sodium (ALEVE) 220 mg cap Take 3 Tabs by mouth daily. No current facility-administered medications for this visit. ROS:  Constitutional: No fever, chills or weight loss  Respiratory: No cough, SOB   CV: No chest pain or Palpitations    Visit Vitals  BP (!) 140/65 (BP 1 Location: Right arm)   Pulse 78   Temp 97.4 °F (36.3 °C) (Temporal)   Resp 18   Ht 5' 1\" (1.549 m)   Wt 153 lb 2 oz (69.5 kg)   SpO2 97%   BMI 28.93 kg/m²     Wt Readings from Last 10 Encounters:   03/30/22 153 lb 2 oz (69.5 kg)   10/13/21 147 lb (66.7 kg)   07/12/21 138 lb 9.6 oz (62.9 kg)   04/07/21 137 lb 3.2 oz (62.2 kg)   10/19/20 145 lb (65.8 kg)   07/20/20 152 lb 12.8 oz (69.3 kg)   02/10/20 156 lb (70.8 kg)   11/11/19 155 lb 6.4 oz (70.5 kg)   08/05/19 159 lb (72.1 kg)   05/20/19 159 lb (72.1 kg)     BP Readings from Last 3 Encounters:   03/30/22 (!) 140/65   10/13/21 120/76   07/12/21 115/60     Physical Examination: General appearance - alert, well appearing, and in no distress  Mental status - alert, oriented to person, place, and time  Eyes - pupils equal and reactive, extraocular eye movements intact  ENT - bilateral external ears and nose normal. Normal lips  Neck - supple, no significant adenopathy, no thyromegaly or mass  Lymphatics - no palpable lymphadenopathy, no hepatosplenomegaly  Chest - clear to auscultation, no wheezes, rales or rhonchi, symmetric air entry. Heart - normal rate, regular rhythm, normal S1, S2, no murmurs, rubs, clicks or gallops  Extremities - peripheral pulses normal, no pedal edema, no clubbing or cyanosis.     A/P:  DM2  Doing a little worse lately, even after tried boosting farxiga 10mg/d and metformin er 2g/d. Discussed options. Try adding rybelsus 3mg/d. Did well with gabapentin 100mg qhs, con't. Check A1c, BMP, adj PRN. Lipids  Norman the crestor 5mg TIW ok. Recheck lipids. Have not had a lot of success with tolerating dose above that. Recheck due ~1/2022    HTN  Well controlled. con't plain lisinopril 20mg every day. Recheck labs. Adj PRN. Hypercalcemia  C/W primary hyperPTH, with occ BP spikes. Con't amlodipine 2.5mg daily PRN high blood pressure >150mmHg, Recheck labs. Avoid thiazides for now. Lab Results   Component Value Date/Time    Calcium 11.8 (H) 10/13/2021 02:56 PM    Phosphorus 3.0 01/28/2019 11:00 AM    PTH, Intact 37 01/28/2019 11:00 AM     PT with L posterior shoulder pain  Did ok with home PT. Con't, Monitor.     F/U 6mo/ PRN

## 2022-03-30 NOTE — PATIENT INSTRUCTIONS
Medicare Wellness Visit, Female     The best way to live healthy is to have a lifestyle where you eat a well-balanced diet, exercise regularly, limit alcohol use, and quit all forms of tobacco/nicotine, if applicable. Regular preventive services are another way to keep healthy. Preventive services (vaccines, screening tests, monitoring & exams) can help personalize your care plan, which helps you manage your own care. Screening tests can find health problems at the earliest stages, when they are easiest to treat. [facility] follows the current, evidence-based guidelines published by the [facility] Jesus Dasilva ([facility]) when recommending preventive services for our patients. Because we follow these guidelines, sometimes recommendations change over time as research supports it. (For example, mammograms used to be recommended annually. Even though Medicare will still pay for an annual mammogram, the newer guidelines recommend a mammogram every two years for women of average risk). Of course, you and your doctor may decide to screen more often for some diseases, based on your risk and your co-morbidities (chronic disease you are already diagnosed with). Preventive services for you include:  - Medicare offers their members a free annual wellness visit, which is time for you and your primary care provider to discuss and plan for your preventive service needs. Take advantage of this benefit every year!  -All adults over the age of 72 should receive the recommended pneumonia vaccines. Current USPSTF guidelines recommend a series of two vaccines for the best pneumonia protection.   -All adults should have a flu vaccine yearly and a tetanus vaccine every 10 years.   -All adults age 48 and older should receive the shingles vaccines (series of two vaccines).       -All adults age 38-68 who are overweight should have a diabetes screening test once every three years.   -All adults born between [birth year] and 1963 should be screened once for Hepatitis C.  -Other screening tests and preventive services for persons with diabetes include: an eye exam to screen for diabetic retinopathy, a kidney function test, a foot exam, and stricter control over your cholesterol.   -Cardiovascular screening for adults with routine risk involves an electrocardiogram (ECG) at intervals determined by your doctor.   -Colorectal cancer screenings should be done for adults age 54-65 with no increased risk factors for colorectal cancer. There are a number of acceptable methods of screening for this type of cancer. Each test has its own benefits and drawbacks. Discuss with your doctor what is most appropriate for you during your annual wellness visit. The different tests include: colonoscopy (considered the best screening method), a fecal occult blood test, a fecal DNA test, and sigmoidoscopy.    -A bone mass density test is recommended when a woman turns 65 to screen for osteoporosis. This test is only recommended one time, as a screening. Some providers will use this same test as a disease monitoring tool if you already have osteoporosis. -Breast cancer screenings are recommended every other year for women of normal risk, age 54-69.  -Cervical cancer screenings for women over age 72 are only recommended with certain risk factors.      Here is a list of your current Health Maintenance items (your personalized list of preventive services) with a due date:  Health Maintenance Due   Topic Date Due    Eye Exam  05/07/2020    Cholesterol Test   01/15/2022    Albumin Urine Test  04/07/2022    Hemoglobin A1C    04/13/2022

## 2022-03-31 LAB
ANION GAP SERPL CALC-SCNC: 10 MMOL/L (ref 5–15)
BUN SERPL-MCNC: 28 MG/DL (ref 6–20)
BUN/CREAT SERPL: 28 (ref 12–20)
CALCIUM SERPL-MCNC: 11.6 MG/DL (ref 8.5–10.1)
CHLORIDE SERPL-SCNC: 103 MMOL/L (ref 97–108)
CHOLEST SERPL-MCNC: 219 MG/DL
CO2 SERPL-SCNC: 23 MMOL/L (ref 21–32)
CREAT SERPL-MCNC: 1 MG/DL (ref 0.55–1.02)
EST. AVERAGE GLUCOSE BLD GHB EST-MCNC: 154 MG/DL
GLUCOSE SERPL-MCNC: 116 MG/DL (ref 65–100)
HBA1C MFR BLD: 7 % (ref 4–5.6)
HDLC SERPL-MCNC: 38 MG/DL
HDLC SERPL: 5.8 {RATIO} (ref 0–5)
LDLC SERPL CALC-MCNC: ABNORMAL MG/DL (ref 0–100)
LDLC SERPL DIRECT ASSAY-MCNC: 111 MG/DL (ref 0–100)
POTASSIUM SERPL-SCNC: 4.7 MMOL/L (ref 3.5–5.1)
SODIUM SERPL-SCNC: 136 MMOL/L (ref 136–145)
TRIGL SERPL-MCNC: 594 MG/DL (ref ?–150)
VLDLC SERPL CALC-MCNC: ABNORMAL MG/DL

## 2022-04-05 NOTE — PROGRESS NOTES
Labs fair, cholesterol a little up, sugar good. Work on [id number], Continue current regimen.

## 2022-06-28 ENCOUNTER — OFFICE VISIT (OUTPATIENT)
Dept: FAMILY MEDICINE CLINIC | Age: 82
End: 2022-06-28
Payer: MEDICARE

## 2022-06-28 VITALS
OXYGEN SATURATION: 98 % | HEIGHT: 61 IN | RESPIRATION RATE: 22 BRPM | DIASTOLIC BLOOD PRESSURE: 64 MMHG | WEIGHT: 149 LBS | TEMPERATURE: 97.1 F | BODY MASS INDEX: 28.13 KG/M2 | SYSTOLIC BLOOD PRESSURE: 138 MMHG | HEART RATE: 97 BPM

## 2022-06-28 DIAGNOSIS — E11.8 TYPE 2 DIABETES MELLITUS WITH COMPLICATION, WITHOUT LONG-TERM CURRENT USE OF INSULIN (HCC): ICD-10-CM

## 2022-06-28 DIAGNOSIS — I10 ESSENTIAL HYPERTENSION: ICD-10-CM

## 2022-06-28 DIAGNOSIS — Z01.818 PREOP EXAMINATION: Primary | ICD-10-CM

## 2022-06-28 DIAGNOSIS — E78.2 MIXED HYPERLIPIDEMIA: ICD-10-CM

## 2022-06-28 DIAGNOSIS — E11.9 TYPE 2 DIABETES MELLITUS WITHOUT COMPLICATION, WITHOUT LONG-TERM CURRENT USE OF INSULIN (HCC): ICD-10-CM

## 2022-06-28 PROBLEM — E11.21 TYPE 2 DIABETES WITH NEPHROPATHY (HCC): Status: RESOLVED | Noted: 2018-05-14 | Resolved: 2022-06-28

## 2022-06-28 PROCEDURE — 93010 ELECTROCARDIOGRAM REPORT: CPT | Performed by: FAMILY MEDICINE

## 2022-06-28 PROCEDURE — 99214 OFFICE O/P EST MOD 30 MIN: CPT | Performed by: FAMILY MEDICINE

## 2022-06-28 PROCEDURE — 93005 ELECTROCARDIOGRAM TRACING: CPT | Performed by: FAMILY MEDICINE

## 2022-06-28 NOTE — PROGRESS NOTES
Chief Complaint   Patient presents with    Pre-op Exam     Cataract Surgery 7/6 and 7/13 Dr. Himanshu Barnes is a 80 y.o. female     Subjective:   Cataracts  Pt seeing Dr. Calvin Rich. Has Phaco/ IOL scheduled at [facility]. Diabetes. Sugars fairly well controlled well on last A1c. Running mid to high 100's on on meter. Improved since starting rybelsus. jax well. Hypoglycemia: none  Tolerating current treatment well  Current medications include Metformin ER 2000mg every day, Rybelsus 3mg/d, and farxiga 5mg /day (didn't improve with trial of farxiga 10mg/d). Lab Results   Component Value Date/Time    Hemoglobin A1c 7.0 (H) 03/30/2022 02:17 PM    Hemoglobin A1c 6.5 (H) 10/13/2021 02:56 PM    Hemoglobin A1c 6.1 (H) 07/12/2021 02:33 PM    Glucose 116 (H) 03/30/2022 02:17 PM    Microalbumin/Creat ratio (mg/g creat) 19 04/07/2021 02:37 PM    Microalbumin,urine random 0.55 04/07/2021 02:37 PM    Microalbumin/creat ratio (POC) <30 11/06/2017 12:00 PM    LDL,Direct 111 (H) 03/30/2022 02:17 PM    LDL, calculated Not calculated due to elevated triglyceride level 03/30/2022 02:17 PM    Creatinine 1.00 03/30/2022 02:17 PM     Lab Results   Component Value Date/Time    Sodium 136 03/30/2022 02:17 PM    Potassium 4.7 03/30/2022 02:17 PM    Chloride 103 03/30/2022 02:17 PM    CO2 23 03/30/2022 02:17 PM    Anion gap 10 03/30/2022 02:17 PM    Glucose 116 (H) 03/30/2022 02:17 PM    BUN 28 (H) 03/30/2022 02:17 PM    Creatinine 1.00 03/30/2022 02:17 PM    BUN/Creatinine ratio 28 (H) 03/30/2022 02:17 PM    GFR est AA >60 03/30/2022 02:17 PM    GFR est non-AA 53 (L) 03/30/2022 02:17 PM    Calcium 11.6 (H) 03/30/2022 02:17 PM     Last eye exam performed 6/2022 with Dr. Calvin Rich. No DR per pt. Last foot exam with Podiatry in [address] [facility] [address]. Pankaj Mota, no abn. Hyperlipidemia. No problems with crestor since visit. Taking crestor 5mg 3x/ week. Daily doses have caused myalgias, so we are con't at [facility]W for now.   Lab Results Component Value Date/Time    Cholesterol, total 219 (H) 03/30/2022 02:17 PM    HDL Cholesterol 38 03/30/2022 02:17 PM    LDL,Direct 111 (H) 03/30/2022 02:17 PM    LDL, calculated Not calculated due to elevated triglyceride level 03/30/2022 02:17 PM    VLDL, calculated  03/30/2022 02:17 PM     Calculation not valid with this patient's other Lipid values. Triglyceride 594 (H) 03/30/2022 02:17 PM    CHOL/HDL Ratio 5.8 (H) 03/30/2022 02:17 PM     Lab Results   Component Value Date/Time    ALT (SGPT) 28 10/13/2021 02:56 PM    Alk. phosphatase 76 10/13/2021 02:56 PM    Bilirubin, total 0.6 10/13/2021 02:56 PM       Patient Active Problem List   Diagnosis Code    S/P TKR (total knee replacement) Z96.659    Mixed hyperlipidemia E78.2    OP (osteoporosis) M81.0    Hypercalcemia E83.52    CTS (carpal tunnel syndrome) G56.00    Gout M10.9    Type 2 diabetes mellitus without complication, without long-term current use of insulin (HCC) E11.9    HTN (hypertension) I10    History of colonoscopy Z98.890    Advance directive discussed with patient Z71.89    Primary hyperparathyroidism ([facility] [address] E21.0      reports that she has never smoked. She has never used smokeless tobacco. She reports current alcohol use of about 2.0 standard drinks of alcohol per week. She reports that she does not use drugs.     Family History   Problem Relation Age of Onset    Diabetes Father     Heart Disease Father     Cancer Father         colon    Other Father         Renal stones    No Known Problems Mother     Other Brother         Renal stones    Diabetes Brother     Arthritis-rheumatoid Brother     Other Brother         Renal Stones    Heart Disease Brother     Cancer Maternal Grandfather         Unknown       Current Outpatient Medications   Medication Sig    lisinopriL (PRINIVIL, ZESTRIL) 20 mg tablet TAKE 1 TABLET BY MOUTH EVERY DAY FOR PRESSURE AND HEART    metFORMIN ER (GLUCOPHAGE XR) 500 mg tablet TAKE 4 TABLETS BY MOUTH EVERY DAY FOR SUGAR    OneTouch Delica Plus Lancet 33 gauge misc USE ONE STRIP TO CHECK SUGAR DAILY AS DIRECTED    OneTouch Ultra Test strip USE TO CHECK BLOOD SUGAR DAILY    dapagliflozin (Farxiga) 10 mg tab tablet One half to one pill daily for sugar    semaglutide (RYBELSUS) 3 mg tablet Take 1 Tablet by mouth Daily (before breakfast). Indications: sugar    gabapentin (NEURONTIN) 100 mg capsule TAKE 1 CAPSULE BY MOUTH EVERY NIGHT. MAX DAILY AMOUNT: 100 MG. INDICATIONS: NERVE PAIN    allopurinoL (ZYLOPRIM) 300 mg tablet TAKE 1 TABLET BY MOUTH ONCE DAILY FOR GOUT    rosuvastatin (CRESTOR) 5 mg tablet TAKE 1 TABLET BY MOUTH EVERY OTHER DAY    amLODIPine (NORVASC) 2.5 mg tablet Take 1 Tab by mouth daily as needed (high blood pressure over 150mmHg). Indications: pressure    cyanocobalamin (VITAMIN B-12) 500 mcg tablet Take 500 mcg by mouth daily.  potassium 99 mg tablet Take 99 mg by mouth daily.  ascorbic acid, vitamin C, (VITAMIN C) 500 mg tablet Take 500 mg by mouth two (2) times a day.  aspirin delayed-release 81 mg tablet Take  by mouth daily.  magnesium oxide (MAG-OX) 400 mg tablet Take 400 mg by mouth daily.  naproxen sodium (ALEVE) 220 mg cap Take 3 Tabs by mouth daily. No current facility-administered medications for this visit.        Allergies   Allergen Reactions    Streptomycin Unknown (comments)       ROS:  Constitutional: No fever, chills or weight loss  Respiratory: No cough, SOB   CV: No chest pain or Palpitations    Visit Vitals  /64   Pulse 97   Temp 97.1 °F (36.2 °C) (Temporal)   Resp 22   Ht 5' 1\" (1.549 m)   Wt 149 lb (67.6 kg)   SpO2 98%   BMI 28.15 kg/m²   -4#  Wt Readings from Last 3 Encounters:   06/28/22 149 lb (67.6 kg)   03/30/22 153 lb 2 oz (69.5 kg)   10/13/21 147 lb (66.7 kg)     BP Readings from Last 3 Encounters:   06/28/22 138/64   03/30/22 (!) 140/65   10/13/21 120/76     Physical Examination: General appearance - alert, well appearing, and in no distress  Mental status - alert, oriented to person, place, and time  Eyes - pupils equal and reactive, extraocular eye movements intact  ENT - bilateral external ears and nose normal. Normal lips  Neck - supple, no significant adenopathy, no thyromegaly or mass  Lymphatics - no palpable lymphadenopathy, no hepatosplenomegaly  Chest - clear to auscultation, no wheezes, rales or rhonchi, symmetric air entry. Heart - normal rate, regular rhythm, normal S1, S2, no murmurs, rubs, clicks or gallops  Extremities - peripheral pulses normal, no pedal edema, no clubbing or cyanosis. A/P:  Cataracts  Fit for surgery. Proceed as planned. DM2  Doing ok lately s/p add rybelsus 3mg/d. Wt improving. Con't current tx. Check A1c, BMP, adj PRN. Lipids  Jax the crestor 5mg TIW ok. Decent control on max jax dose. Con't current tx. HTN  Well controlled. con't plain lisinopril 20mg every day. Recheck labs. Adj PRN. Hypercalcemia  C/W primary hyperPTH, with occ BP spikes. Con't amlodipine 2.5mg daily PRN high blood pressure >150mmHg. Has plenty. Recheck labs. Avoid thiazides for now.     Lab Results   Component Value Date/Time    Calcium 11.6 (H) 03/30/2022 02:17 PM    Phosphorus 3.0 01/28/2019 11:00 AM    PTH, Intact 37 01/28/2019 11:00 AM     F/U 3mo/ PRN

## 2022-06-28 NOTE — H&P (VIEW-ONLY)
Rafa Washburn is a 80 y.o. female presenting for/with:    Chief Complaint   Patient presents with    Pre-op Exam     Cataract Surgery 7/6 and 7/13 Dr. Loy Bush       Visit Vitals  /64   Pulse 97   Temp 97.1 °F (36.2 °C) (Temporal)   Resp 22   Ht 5' 1\" (1.549 m)   Wt 149 lb (67.6 kg)   SpO2 98%   BMI 28.15 kg/m²     Pain Scale: /10  Pain Location:     1. \"Have you been to the ER, urgent care clinic since your last visit? Hospitalized since your last visit? \" No    2. \"Have you seen or consulted any other health care providers outside of the [address] [place] [address] since your last visit? \" Larene Bias Dr. Loy Bush (eye)    3. For patients aged 39-70: Has the patient had a colonoscopy / FIT/ Cologuard? Yes - no Care Gap present      If the patient is female:    4. For patients aged 41-77: Has the patient had a mammogram within the past 2 years? No        5. For patients aged 21-65: Has the patient had a pap smear?  NA - based on age or sex      Symptom review:  NO  Fever   NO  Shaking chills  NO  Cough  NO  Body aches  NO  Coughing up blood  NO  Chest congestion  NO  Chest pain  NO  Shortness of breath  NO  Profound Loss of smell/taste  NO  Nausea/Vomiting   NO  Loose stool/Diarrhea  NO  any skin issues    Patient Risk Factors Reviewed as follows:  NO  have you been in Close contact with confirmed COVID19 patient   NO  History of recent travel to affected geographical areas within the past 14 days  NO  COPD  NO  Active Cancer/Leukemia/Lymphoma/Chemotherapy  NO  Oral steroid use  NO  Pregnant  YES  Diabetes Mellitus  NO  Heart disease  NO  Asthma  NO Health care worker at home  NO Health care worker  NO Is there a Pregnant Woman in the home  NO Dialysis pt in the home   NO a large number of people living in the home    Learning Assessment 10/13/2021   PRIMARY LEARNER Patient   PRIMARY LANGUAGE ENGLISH   LEARNER PREFERENCE PRIMARY READING     -   ANSWERED BY patient   RELATIONSHIP SELF     Fall Risk Assessment, last 12 mths 6/28/2022   Able to walk? Yes   Fall in past 12 months? 1   Do you feel unsteady? 0   Are you worried about falling 0   Is TUG test greater than 12 seconds? -   Is the gait abnormal? 0   Number of falls in past 12 months 1   Fall with injury? 0       3 most recent PHQ Screens 6/28/2022   PHQ Not Done -   Little interest or pleasure in doing things Not at all   Feeling down, depressed, irritable, or hopeless Not at all   Total Score PHQ 2 0     Abuse Screening Questionnaire 6/28/2022   Do you ever feel afraid of your partner? N   Are you in a relationship with someone who physically or mentally threatens you? N   Is it safe for you to go home?  Y       ADL Assessment 6/28/2022   Feeding yourself No Help Needed   Getting from bed to chair No Help Needed   Getting dressed No Help Needed   Bathing or showering No Help Needed   Walk across the room (includes cane/walker) No Help Needed   Using the telphone No Help Needed   Taking your medications No Help Needed   Preparing meals No Help Needed   Managing money (expenses/bills) No Help Needed   Moderately strenuous housework (laundry) No Help Needed   Shopping for personal items (toiletries/medicines) No Help Needed   Shopping for groceries No Help Needed   Driving No Help Needed   Climbing a flight of stairs No Help Needed   Getting to places beyond walking distances No Help Needed      Advance Care Planning 6/28/2022   Patient's Healthcare Decision Maker is: Named in scanned ACP document   Confirm Advance Directive Yes, on file

## 2022-06-28 NOTE — H&P (VIEW-ONLY)
Shiva Golden is a 80 y.o. female presenting for/with:    Chief Complaint   Patient presents with    Pre-op Exam     Cataract Surgery 7/6 and 7/13 Dr. Az Allen       Visit Vitals  /64   Pulse 97   Temp 97.1 °F (36.2 °C) (Temporal)   Resp 22   Ht 5' 1\" (1.549 m)   Wt 149 lb (67.6 kg)   SpO2 98%   BMI 28.15 kg/m²     Pain Scale: /10  Pain Location:     1. \"Have you been to the ER, urgent care clinic since your last visit? Hospitalized since your last visit? \" No    2. \"Have you seen or consulted any other health care providers outside of the [address] [place] [address] since your last visit? \" Joanie Allen (eye)    3. For patients aged 39-70: Has the patient had a colonoscopy / FIT/ Cologuard? Yes - no Care Gap present      If the patient is female:    4. For patients aged 41-77: Has the patient had a mammogram within the past 2 years? No        5. For patients aged 21-65: Has the patient had a pap smear?  NA - based on age or sex      Symptom review:  NO  Fever   NO  Shaking chills  NO  Cough  NO  Body aches  NO  Coughing up blood  NO  Chest congestion  NO  Chest pain  NO  Shortness of breath  NO  Profound Loss of smell/taste  NO  Nausea/Vomiting   NO  Loose stool/Diarrhea  NO  any skin issues    Patient Risk Factors Reviewed as follows:  NO  have you been in Close contact with confirmed COVID19 patient   NO  History of recent travel to affected geographical areas within the past 14 days  NO  COPD  NO  Active Cancer/Leukemia/Lymphoma/Chemotherapy  NO  Oral steroid use  NO  Pregnant  YES  Diabetes Mellitus  NO  Heart disease  NO  Asthma  NO Health care worker at home  NO Health care worker  NO Is there a Pregnant Woman in the home  NO Dialysis pt in the home   NO a large number of people living in the home    Learning Assessment 10/13/2021   PRIMARY LEARNER Patient   PRIMARY LANGUAGE ENGLISH   LEARNER PREFERENCE PRIMARY READING     -   ANSWERED BY patient   RELATIONSHIP SELF     Fall Risk Assessment, last 12 mths 6/28/2022   Able to walk? Yes   Fall in past 12 months? 1   Do you feel unsteady? 0   Are you worried about falling 0   Is TUG test greater than 12 seconds? -   Is the gait abnormal? 0   Number of falls in past 12 months 1   Fall with injury? 0       3 most recent PHQ Screens 6/28/2022   PHQ Not Done -   Little interest or pleasure in doing things Not at all   Feeling down, depressed, irritable, or hopeless Not at all   Total Score PHQ 2 0     Abuse Screening Questionnaire 6/28/2022   Do you ever feel afraid of your partner? N   Are you in a relationship with someone who physically or mentally threatens you? N   Is it safe for you to go home?  Y       ADL Assessment 6/28/2022   Feeding yourself No Help Needed   Getting from bed to chair No Help Needed   Getting dressed No Help Needed   Bathing or showering No Help Needed   Walk across the room (includes cane/walker) No Help Needed   Using the telphone No Help Needed   Taking your medications No Help Needed   Preparing meals No Help Needed   Managing money (expenses/bills) No Help Needed   Moderately strenuous housework (laundry) No Help Needed   Shopping for personal items (toiletries/medicines) No Help Needed   Shopping for groceries No Help Needed   Driving No Help Needed   Climbing a flight of stairs No Help Needed   Getting to places beyond walking distances No Help Needed      Advance Care Planning 6/28/2022   Patient's Healthcare Decision Maker is: Named in scanned ACP document   Confirm Advance Directive Yes, on file

## 2022-06-28 NOTE — PROGRESS NOTES
Carine Noel is a 80 y.o. female presenting for/with:    Chief Complaint   Patient presents with    Pre-op Exam     Cataract Surgery 7/6 and 7/13 Dr. Jacky Aguiar       Visit Vitals  /64   Pulse 97   Temp 97.1 °F (36.2 °C) (Temporal)   Resp 22   Ht 5' 1\" (1.549 m)   Wt 149 lb (67.6 kg)   SpO2 98%   BMI 28.15 kg/m²     Pain Scale: /10  Pain Location:     1. \"Have you been to the ER, urgent care clinic since your last visit? Hospitalized since your last visit? \" No    2. \"Have you seen or consulted any other health care providers outside of the [address] [place] [address] since your last visit? \" Shayna [place] Aguiar (eye)    3. For patients aged 39-70: Has the patient had a colonoscopy / FIT/ Cologuard? Yes - no Care Gap present      If the patient is female:    4. For patients aged 41-77: Has the patient had a mammogram within the past 2 years? No        5. For patients aged 21-65: Has the patient had a pap smear?  NA - based on age or sex      Symptom review:  NO  Fever   NO  Shaking chills  NO  Cough  NO  Body aches  NO  Coughing up blood  NO  Chest congestion  NO  Chest pain  NO  Shortness of breath  NO  Profound Loss of smell/taste  NO  Nausea/Vomiting   NO  Loose stool/Diarrhea  NO  any skin issues    Patient Risk Factors Reviewed as follows:  NO  have you been in Close contact with confirmed COVID19 patient   NO  History of recent travel to affected geographical areas within the past 14 days  NO  COPD  NO  Active Cancer/Leukemia/Lymphoma/Chemotherapy  NO  Oral steroid use  NO  Pregnant  YES  Diabetes Mellitus  NO  Heart disease  NO  Asthma  NO Health care worker at home  NO Health care worker  NO Is there a Pregnant Woman in the home  NO Dialysis pt in the home   NO a large number of people living in the home    Learning Assessment 10/13/2021   PRIMARY LEARNER Patient   PRIMARY LANGUAGE ENGLISH   LEARNER PREFERENCE PRIMARY READING     -   ANSWERED BY patient   RELATIONSHIP SELF     Fall Risk Assessment, last 12 mths 6/28/2022   Able to walk? Yes   Fall in past 12 months? 1   Do you feel unsteady? 0   Are you worried about falling 0   Is TUG test greater than 12 seconds? -   Is the gait abnormal? 0   Number of falls in past 12 months 1   Fall with injury? 0       3 most recent PHQ Screens 6/28/2022   PHQ Not Done -   Little interest or pleasure in doing things Not at all   Feeling down, depressed, irritable, or hopeless Not at all   Total Score PHQ 2 0     Abuse Screening Questionnaire 6/28/2022   Do you ever feel afraid of your partner? N   Are you in a relationship with someone who physically or mentally threatens you? N   Is it safe for you to go home?  Y       ADL Assessment 6/28/2022   Feeding yourself No Help Needed   Getting from bed to chair No Help Needed   Getting dressed No Help Needed   Bathing or showering No Help Needed   Walk across the room (includes cane/walker) No Help Needed   Using the telphone No Help Needed   Taking your medications No Help Needed   Preparing meals No Help Needed   Managing money (expenses/bills) No Help Needed   Moderately strenuous housework (laundry) No Help Needed   Shopping for personal items (toiletries/medicines) No Help Needed   Shopping for groceries No Help Needed   Driving No Help Needed   Climbing a flight of stairs No Help Needed   Getting to places beyond walking distances No Help Needed      Advance Care Planning 6/28/2022   Patient's Healthcare Decision Maker is: Named in scanned ACP document   Confirm Advance Directive Yes, on file

## 2022-06-30 ENCOUNTER — ANESTHESIA EVENT (OUTPATIENT)
Dept: SURGERY | Age: 82
End: 2022-06-30
Payer: MEDICARE

## 2022-06-30 NOTE — ANESTHESIA PREPROCEDURE EVALUATION
Relevant Problems   CARDIOVASCULAR   (+) HTN (hypertension)      ENDOCRINE   (+) Type 2 diabetes mellitus without complication, without long-term current use of insulin (HCC)       Anesthetic History   No history of anesthetic complications            Review of Systems / Medical History  Patient summary reviewed, nursing notes reviewed and pertinent labs reviewed    Pulmonary  Within defined limits                 Neuro/Psych   Within defined limits           Cardiovascular    Hypertension          Hyperlipidemia         GI/Hepatic/Renal  Within defined limits              Endo/Other    Diabetes: type 2    Arthritis  Pertinent negatives: No obesity (BMI 28.1)   Other Findings   Comments: H/o hyperparathyroidism         Physical Exam    Airway  Mallampati: I  TM Distance: > 6 cm  Neck ROM: normal range of motion   Mouth opening: Normal     Cardiovascular    Rhythm: regular  Rate: normal         Dental  No notable dental hx       Pulmonary  Breath sounds clear to auscultation               Abdominal  GI exam deferred       Other Findings            Anesthetic Plan    ASA: 2  Anesthesia type: MAC          Induction: Intravenous  Anesthetic plan and risks discussed with: Patient

## 2022-07-01 RX ORDER — ACETAMINOPHEN 500 MG
2000 TABLET ORAL DAILY
COMMUNITY

## 2022-07-01 NOTE — PERIOP NOTES
[address] [place] [address]  SURGICAL PRE-ADMISSION INSTRUCTIONS    ARRIVAL  · You will be called the day before your surgery with your expected arrival time. · Sign in at the  of the hospital.  You will be directed to the Surgical Waiting Room. · Please arrive at your scheduled appointment time. You have been scheduled to arrive for your procedure one or two hours prior to the expected start time of your procedure. · Every effort will be made to minimize your wait but please be aware that unforeseen circumstances may affect our schedule. EATING  · DO NOT EAT OR DRINK ANYTHING AFTER MIDNIGHT ON THE EVENING BEFORE YOUR SURGERY OR ON THE DAY OF YOUR SURGERY except for your medications (as instructed) with a sip of water. · Do not use gum, mints or lozenges on the morning of your surgery. · Please do not smoke or chew tobacco before your surgery. MEDICATIONS   · Take the following medications on the morning of your surgery with the smallest amount of water possible : Amlodipine    STOP THESE MEDICATIONS AT THE TIMES LISTED BELOW  Aspirin ;  7 days before                                                NSAIDS (Indocin, Ibuprofen, Naprosyn) ;  7 days before     DRIVING/TRANSPORATION  · Have a responsible adult to drive you home from the hospital and to stay with you over night. Please have them plan to remain in the hospital during your surgery. Your surgery will not be done if you do not have a responsible adult to take you home and to stay with you. · If you have arranged for public transport, you must have a responsible adult to ride with you (who is not the ). · You may not drive for 24 hours after anesthesia. PREPARATION  · If you have a Living WiIl/Advance Directive, please bring a copy with you to scan into your chart. · Please DO NOT wear makeup or nail polish  · Please leave valuables at home,  DO NOT wear jewelry.   · Wear loose, comfortable clothing that is large enough to cover a bulky dressing.     SPECIAL INSTRUCTIONS:  · none    Reviewed above preoperative instructions and answered questions by phone interview    Patient:  Steve Rosalind   Date:     July 1, 2022  Time:   11:57 AM    RN:  Christopher Palomino RN    Date:     July 1, 2022  Time:   11:57 AM

## 2022-07-04 PROBLEM — H25.12 AGE-RELATED NUCLEAR CATARACT, LEFT EYE: Status: ACTIVE | Noted: 2022-07-04

## 2022-07-04 PROBLEM — H25.12 AGE-RELATED NUCLEAR CATARACT, LEFT EYE: Chronic | Status: ACTIVE | Noted: 2022-07-04

## 2022-07-06 ENCOUNTER — ANESTHESIA (OUTPATIENT)
Dept: SURGERY | Age: 82
End: 2022-07-06
Payer: MEDICARE

## 2022-07-06 ENCOUNTER — HOSPITAL ENCOUNTER (OUTPATIENT)
Age: 82
Setting detail: OUTPATIENT SURGERY
Discharge: HOME | End: 2022-07-06
Attending: OPHTHALMOLOGY | Admitting: OPHTHALMOLOGY
Payer: MEDICARE

## 2022-07-06 VITALS
SYSTOLIC BLOOD PRESSURE: 112 MMHG | TEMPERATURE: 98 F | OXYGEN SATURATION: 97 % | BODY MASS INDEX: 28.13 KG/M2 | HEART RATE: 79 BPM | RESPIRATION RATE: 16 BRPM | DIASTOLIC BLOOD PRESSURE: 64 MMHG | WEIGHT: 149 LBS | HEIGHT: 61 IN

## 2022-07-06 PROBLEM — H25.12 AGE-RELATED NUCLEAR CATARACT, LEFT EYE: Status: RESOLVED | Noted: 2022-07-04 | Resolved: 2022-07-06

## 2022-07-06 PROBLEM — H25.12 AGE-RELATED NUCLEAR CATARACT, LEFT EYE: Chronic | Status: RESOLVED | Noted: 2022-07-04 | Resolved: 2022-07-06

## 2022-07-06 LAB
GLUCOSE BLD STRIP.AUTO-MCNC: 122 MG/DL (ref 65–117)
SERVICE CMNT-IMP: ABNORMAL

## 2022-07-06 PROCEDURE — V2632 POST CHMBR INTRAOCULAR LENS: HCPCS | Performed by: OPHTHALMOLOGY

## 2022-07-06 PROCEDURE — 76060000031 HC ANESTHESIA FIRST 0.5 HR: Performed by: OPHTHALMOLOGY

## 2022-07-06 PROCEDURE — 2709999900 HC NON-CHARGEABLE SUPPLY: Performed by: OPHTHALMOLOGY

## 2022-07-06 PROCEDURE — 77030035283: Performed by: OPHTHALMOLOGY

## 2022-07-06 PROCEDURE — 77030020828: Performed by: OPHTHALMOLOGY

## 2022-07-06 PROCEDURE — 77030013987 HC SLV OPTH IRR ALCN -B: Performed by: OPHTHALMOLOGY

## 2022-07-06 PROCEDURE — 74011250636 HC RX REV CODE- 250/636: Performed by: ANESTHESIOLOGY

## 2022-07-06 PROCEDURE — 76210000020 HC REC RM PH II FIRST 0.5 HR: Performed by: OPHTHALMOLOGY

## 2022-07-06 PROCEDURE — 77030014067 HC TIP PHACO KLMN ALCN -B: Performed by: OPHTHALMOLOGY

## 2022-07-06 PROCEDURE — 77030007855 HC KNF OPHTH IKNF ALCN -A: Performed by: OPHTHALMOLOGY

## 2022-07-06 PROCEDURE — 74011000250 HC RX REV CODE- 250: Performed by: OPHTHALMOLOGY

## 2022-07-06 PROCEDURE — 77030018831 HC SOL IRR H20 BAXT -A: Performed by: OPHTHALMOLOGY

## 2022-07-06 PROCEDURE — 77030013353: Performed by: OPHTHALMOLOGY

## 2022-07-06 PROCEDURE — 76010000154 HC OR TIME FIRST 0.5 HR: Performed by: OPHTHALMOLOGY

## 2022-07-06 PROCEDURE — 74011000250 HC RX REV CODE- 250: Performed by: ANESTHESIOLOGY

## 2022-07-06 PROCEDURE — 74011250636 HC RX REV CODE- 250/636: Performed by: OPHTHALMOLOGY

## 2022-07-06 PROCEDURE — 82962 GLUCOSE BLOOD TEST: CPT

## 2022-07-06 DEVICE — LENS IOL POST 1-PC 6X13 18.0 -- ACRYSOF: Type: IMPLANTABLE DEVICE | Site: EYE | Status: FUNCTIONAL

## 2022-07-06 RX ORDER — TETRACAINE HYDROCHLORIDE 5 MG/ML
1 SOLUTION OPHTHALMIC
Status: COMPLETED | OUTPATIENT
Start: 2022-07-06 | End: 2022-07-06

## 2022-07-06 RX ORDER — TROPICAMIDE 10 MG/ML
1 SOLUTION/ DROPS OPHTHALMIC
Status: COMPLETED | OUTPATIENT
Start: 2022-07-06 | End: 2022-07-06

## 2022-07-06 RX ORDER — LIDOCAINE HYDROCHLORIDE 20 MG/ML
2 INJECTION, SOLUTION EPIDURAL; INFILTRATION; INTRACAUDAL; PERINEURAL ONCE
Status: COMPLETED | OUTPATIENT
Start: 2022-07-06 | End: 2022-07-06

## 2022-07-06 RX ORDER — MIDAZOLAM HYDROCHLORIDE 1 MG/ML
INJECTION, SOLUTION INTRAMUSCULAR; INTRAVENOUS AS NEEDED
Status: DISCONTINUED | OUTPATIENT
Start: 2022-07-06 | End: 2022-07-06 | Stop reason: HOSPADM

## 2022-07-06 RX ORDER — KETOROLAC TROMETHAMINE 5 MG/ML
1 SOLUTION OPHTHALMIC
Status: DISCONTINUED | OUTPATIENT
Start: 2022-07-06 | End: 2022-07-06 | Stop reason: HOSPADM

## 2022-07-06 RX ORDER — TETRACAINE HYDROCHLORIDE 5 MG/ML
SOLUTION OPHTHALMIC AS NEEDED
Status: DISCONTINUED | OUTPATIENT
Start: 2022-07-06 | End: 2022-07-06 | Stop reason: HOSPADM

## 2022-07-06 RX ORDER — SODIUM CHLORIDE, SODIUM LACTATE, POTASSIUM CHLORIDE, CALCIUM CHLORIDE 600; 310; 30; 20 MG/100ML; MG/100ML; MG/100ML; MG/100ML
25 INJECTION, SOLUTION INTRAVENOUS CONTINUOUS
Status: DISCONTINUED | OUTPATIENT
Start: 2022-07-06 | End: 2022-07-06 | Stop reason: HOSPADM

## 2022-07-06 RX ORDER — TETRACAINE HYDROCHLORIDE 5 MG/ML
2 SOLUTION OPHTHALMIC
Status: DISPENSED | OUTPATIENT
Start: 2022-07-06 | End: 2022-07-06

## 2022-07-06 RX ORDER — FLUMAZENIL 0.1 MG/ML
INJECTION INTRAVENOUS AS NEEDED
Status: DISCONTINUED | OUTPATIENT
Start: 2022-07-06 | End: 2022-07-06 | Stop reason: HOSPADM

## 2022-07-06 RX ORDER — TOBRAMYCIN AND DEXAMETHASONE 3; 1 MG/ML; MG/ML
1 SUSPENSION/ DROPS OPHTHALMIC AS NEEDED
Status: COMPLETED | OUTPATIENT
Start: 2022-07-06 | End: 2022-07-06

## 2022-07-06 RX ORDER — PHENYLEPHRINE HYDROCHLORIDE 100 MG/ML
1 SOLUTION/ DROPS OPHTHALMIC
Status: COMPLETED | OUTPATIENT
Start: 2022-07-06 | End: 2022-07-06

## 2022-07-06 RX ADMIN — TROPICAMIDE 1 DROP: 10 SOLUTION/ DROPS OPHTHALMIC at 12:34

## 2022-07-06 RX ADMIN — KETOROLAC TROMETHAMINE 1 DROP: 0.5 SOLUTION OPHTHALMIC at 12:16

## 2022-07-06 RX ADMIN — SODIUM CHLORIDE, POTASSIUM CHLORIDE, SODIUM LACTATE AND CALCIUM CHLORIDE 25 ML/HR: 600; 310; 30; 20 INJECTION, SOLUTION INTRAVENOUS at 12:30

## 2022-07-06 RX ADMIN — TETRACAINE HYDROCHLORIDE 1 DROP: 5 SOLUTION OPHTHALMIC at 12:51

## 2022-07-06 RX ADMIN — TETRACAINE HYDROCHLORIDE 1 DROP: 5 SOLUTION OPHTHALMIC at 12:15

## 2022-07-06 RX ADMIN — FLUMAZENIL 0.3 MG: 0.1 INJECTION INTRAVENOUS at 13:34

## 2022-07-06 RX ADMIN — TETRACAINE HYDROCHLORIDE 1 DROP: 5 SOLUTION OPHTHALMIC at 12:33

## 2022-07-06 RX ADMIN — TROPICAMIDE 1 DROP: 10 SOLUTION/ DROPS OPHTHALMIC at 12:52

## 2022-07-06 RX ADMIN — MIDAZOLAM 1 MG: 1 INJECTION INTRAMUSCULAR; INTRAVENOUS at 13:23

## 2022-07-06 RX ADMIN — PHENYLEPHRINE HYDROCHLORIDE 1 DROP: 100 SOLUTION/ DROPS OPHTHALMIC at 12:16

## 2022-07-06 RX ADMIN — LIDOCAINE HYDROCHLORIDE 2 DROP: 35 GEL OPHTHALMIC at 12:34

## 2022-07-06 RX ADMIN — PHENYLEPHRINE HYDROCHLORIDE 1 DROP: 100 SOLUTION/ DROPS OPHTHALMIC at 12:52

## 2022-07-06 RX ADMIN — TROPICAMIDE 1 DROP: 10 SOLUTION/ DROPS OPHTHALMIC at 12:16

## 2022-07-06 RX ADMIN — MIDAZOLAM 1 MG: 1 INJECTION INTRAMUSCULAR; INTRAVENOUS at 13:15

## 2022-07-06 RX ADMIN — LIDOCAINE HYDROCHLORIDE 2 DROP: 35 GEL OPHTHALMIC at 12:52

## 2022-07-06 RX ADMIN — KETOROLAC TROMETHAMINE 1 DROP: 0.5 SOLUTION OPHTHALMIC at 12:34

## 2022-07-06 RX ADMIN — PHENYLEPHRINE HYDROCHLORIDE 1 DROP: 100 SOLUTION/ DROPS OPHTHALMIC at 12:34

## 2022-07-06 NOTE — OP NOTES
[facility]  OPERATIVE REPORT    Name:  Marquis Ortega  MR#:  [id number]  :  1940  ACCOUNT #:  [de-identified]  DATE OF SERVICE:  2022    PREOPERATIVE DIAGNOSIS:  Age-related nuclear cataract, left eye. POSTOPERATIVE DIAGNOSIS:  Pseudophakia, left eye. PROCEDURE PERFORMED:  Phacoemulsification of cataract of left eye with intraocular lens implant. SURGEON:  Evan Ibanez MD    ASSISTANT:  None. ANESTHESIA:  Topical 3.5% lidocaine gel, 0.5% tetracaine drops, IV sedation by Anesthesia, and 2% preservative-free intraocular lidocaine. COMPLICATIONS:  None. SPECIMENS REMOVED:  None. IMPLANTS:  IOL. ESTIMATED BLOOD LOSS:  None. INDICATIONS:  This 77-year-old white female noted painless progressive decreased visual acuity in both eyes secondary to cataract formation affecting distance and near vision, driving and reading and not improved by refraction. She presents for an elective extracapsular cataract extraction with lens implant in her left eye to improve vision and lifestyle. In addition to cataracts and adult-onset diabetes, she has had a total knee replacement, has hyperlipidemia, osteoporosis, hypercalcemia, carpal tunnel syndrome, diabetes mellitus type 2, hypertension, history of colonoscopy, and primary hyperparathyroidism. ALLERGIES:  SHE HAS ALLERGIES TO STREPTOMYCIN. She was cleared for surgery by Dr. Adan Bryant. Ocular examination at time of admission reveals a best corrected visual acuity of 20/50 in either eye with intraocular pressures of 12 mmHg in the right eye and 9 mmHg in the left eye. Extraocular examination reveals full fields to confrontation, exophthalmos, and dermatochalasis. Anterior segment shows normal conjunctiva with clear corneas and a small pterygium limbal scar in the left eye. Anterior chamber angles were open, chambers deep and clear, with normal iris and pupil.   She has 6+ nuclear sclerotic lens changes with axial posterior subcapsular cataract, worse on the left side. Dilated fundus shows a 0.8 cup-to-disk ratio, normal blood vessels, and dull foveal reflexes with no evidence of diabetic ocular change. PROCEDURE:  The patient was taken to the main operating room after receiving pupillary dilation, application of Honan balloon and topical anesthesia in the preop area, placed in the supine position and given IV sedation by Anesthesia. The patient was prepped and draped in the standard fashion for intraocular microsurgery of the left eye with a temporal approach. A limbal paracentesis was made with a 15-degree blade and the anterior chamber filled with Viscoat. A thu keratome was used to enter the anterior chamber from the temporal limbus in a four-plane fashion creating a 3 mm self-sealing corneal tunnel incision. An anterior capsulorrhexis was performed with capsulorrhexis forceps followed by hydrodissection and hydrodelineation of the nucleus in the capsular bag with balanced salt solution achieving rotation. A CDE of 8.04, phacoemulsification time of 1 minute 1 second, at an average power of 3.4% was required to remove the nucleus in a phaco chop technique in burst mode at high vacuum using OZil technology followed by irrigation/aspiration of the remaining cortex and vacuum polishing of the posterior capsule. The capsular bag was then filled with Provisc and an Janes AcrySof posterior chamber foldable acrylic intraocular lens, model [id number], power +18.0 diopters, was injected into the capsular bag using an Janes cartridge and the lens centered. Irrigation/aspiration was used to remove the Provisc from the anterior chamber and capsular bag. The anterior chamber was filled with balanced salt solution and the incisions tested and found to be watertight without a suture.   A collagen shield soaked in Tobradex ophthalmic drops and tetracaine drops was placed on the cornea followed by Pred-G ophthalmic ointment. The speculum and drape were then removed and an eye shield taped over the eye. The patient tolerated the procedure well and left the operating room for the step-down unit under the care of Anesthesia, in a satisfactory postop condition, alert and awake. The patient is to be discharged home when released by Anesthesia, to remain at bed rest with head elevated for the next 24 hours, resume all customary preop diet and medications and take Tylenol as needed for discomfort. A followup appointment is scheduled in my office on 07/07/2022 at 09:15 a.m.       Sofya Mckinnon MD      [id number]  D:  07/06/2022 13:54  T:  07/06/2022 17:55  JOB #:  [id number]

## 2022-07-06 NOTE — DISCHARGE INSTRUCTIONS
[facility] EYE [facility]      POST OPERATIVE INSTRUCTIONS AND INFORMATION         1. THE FIRST 24 HOURS AFTER SURGERY, YOU WILL BE ASKED TO REMAIN AT BEDREST WITH YOUR HEAD ELEVATED AT 39 DEGREES THIS CAN BE DONE BY SLEEPING ON THE PILLOWS OR IN A RECLINER. YOU CAN GET UP AS NECESSARY. IF AN EYE SHIELD IS PLACED, IT  MUST REMAIN FIRMLY IN PLACE FOR THE FIRST 24 HOURS. IT WILL BE REMOVED IN THE OFFICE ON THE DAY FOLLOWING SURGERY WHEN I EXAMINE YOUR EYE. IF YOU ARE DISCHARGED WITH SUN GLASSES, THEY ARE TO BE WORN UNTIL BEDTIME WHEN AN EYE SHIELD IS TO BE TAPED OVER THE OPERATED EYE FOR SLEEP. 2.  YOU WILL NEED TO BRING ALL EYE MEDICATIONS TO YOUR APPOINTMENT THE DAY AFTER SURGERY. 3.   YOUR POST OP VISIT SCHEDULE IS AS FOLLOWS:             * ONE DAY AFTER SURGERY             * ONE WEEK AFTER SURGERY             * SIX WEEKS AFTER SURGERY (SPECTACLE REFRACTION AND DILATED EXAMINATION)    4. IT IS IMPORTANT THAT YOU WEAR EYE PROTECTION AT ALL TIMES FOR THE      FIRST WEEK AFTER [facility] CATARACT SURGERY. DURING THE DAY, YOU WILL NEED TO WEAR EITHER OUR CURRENT SPECTACLES WITH A PLAIN WINDOW GLASS LENS OR YOU MAY WISH TO PURCHASE A PAIR OF DRUG STORE BIFOCALS WITH A POWER OF +2.50 OR SO. WHEN OUTSIDE, YOU MUST WEAR THE SOLAR LUEVANO THAT ARE PROVIDED FOR YOU. AT BEDTIME, YOU MUST WEAR THE EYE SHIELD THAT IS ALSO PROVIDED. AGAIN THIS IS FOR THE FIRST WEEK FOLLOWING YOUR SURGERY.     5. IMPORTANT DOS AND DONTS:             *AVOID CONSTIPATION             *DO NOT PARTICIPATE IN SPORTS OR STRENUOUS PHYSICAL ACTIVITY INCLUDING                         SEXUAL RELATIONS             *DO NOT DRIVE FOR ONE WEEK OFTER EACH CATARACT SURGERY             *AVOID POWDERS, SPRAYS, OR OTHER THINGS THAT MIGHT GET IN YOUR EYES             *DO NOT RUB YOUR EYE OR PUT ANY PRESSURE ON YOUR EYE    6. IF YOUR HAIR IS WASHED BY SOMEONE ELSE, HAVE THEM POSITION YOU SO THAT YOU LEAN YOUR HEAD BACKWARDS INTO THE SINK TO AVOID SOAPY WATER FROM GETTING IN YOUR EYES. YOU SHOULD ALSO WEAR YOUR EYE SHIELD TO PREVENT INJURY OR CONTAMINATION TO THE EYE. 7. DO NOT HESITATE TO CALL OUR OFFICE IF YOU FEEL SOMETHING IS NOT RIGHT OR YOU HAVE ANY QUESTIONS, UNUSUAL SYMPTOMS, OR SUDDEN CHANGES IN YOUR VISION. 8. OUR TELEPHONE NUM[facility]SPolina [facility] OFFICE              [phone]              *[place] OFFICE         [phone]    9. [address] YOU FOR ENTRUSTING YOUR EYE CARE TO US.    10.  YOU HAVE RECEIVED SEDATION AS PART OF YOUR PROCEDURE. NO DRIVING OR OPERATING HEAVY MACHINERY FOR 24 HOURS. YOUR                       BALANCE AND JUDGEMENT MAY BE IMPAIRED. DROWSINESS MAY ALSO OCCUR. [facility] EYE [facility]      POST OPERATIVE INSTRUCTIONS AND INFORMATION         3. THE FIRST 24 HOURS AFTER SURGERY, YOU WILL BE ASKED TO REMAIN AT BEDREST WITH YOUR HEAD ELEVATED AT 39 DEGREES THIS CAN BE DONE BY SLEEPING ON THE PILLOWS OR IN A RECLINER. YOU CAN GET UP AS NECESSARY. YOUR EYE SHIELD MUST REMAIN FIRMLY IN PLACE FOR THE FIRST 24 HOURS. IT WILL BE REMOVED IN THE OFFICE ON THE DAY FOLLOWING SURGERY WHEN I EXAMINE YOUR EYE. 4.  YOU WILL NEED TO BRING ALL EYE MEDICATIONS TO YOUR APPOINTMENT THE DAY AFTER SURGERY. 3.   YOUR POST OP VISIT SCHEDULE IS AS FOLLOWS:             * ONE DAY AFTER SURGERY             * ONE WEEK AFTER SURGERY             * SIX WEEKS AFTER SURGERY (SPECTACLE REFRACTION AND DILATED EXAMINATION)    10. IT IS IMPORTANT THAT YOU WEAR EYE PROTECTION AT ALL TIMES FOR THE      FIRST WEEK AFTER EACH CATARACT SURGERY. DURING THE DAY, YOU WILL NEED TO WEAR EITHER OUR CURRENT SPECTACLES WITH A PLAIN WINDOW GLASS LENS OR YOU MAY WISH TO PURCHASE A PAIR OF DRUG STORE BIFOCALS WITH A POWER OF +2.50 OR SO. WHEN OUTSIDE, YOU MUST WEAR THE SOLAR LUEVANO THAT ARE PROVIDED FOR YOU. AT BEDTIME, YOU MUST WEAR THE EYE SHIELD THAT IS ALSO PROVIDED.   AGAIN THIS IS FOR THE FIRST WEEK FOLLOWING YOUR SURGERY. 11. IMPORTANT DOS AND DONTS:             *AVOID CONSTIPATION             *DO NOT PARTICIPATE IN SPORTS OR STRENUOUS PHYSICAL ACTIVITY INCLUDING                         SEXUAL RELATIONS             *DO NOT DRIVE FOR ONE WEEK OFTER EACH CATARACT SURGERY             *AVOID POWDERS, SPRAYS, OR OTHER THINGS THAT MIGHT GET IN YOUR EYES             *DO NOT RUB YOUR EYE OR PUT ANY PRESSURE ON YOUR EYE    12. IF YOUR HAIR IS WASHED BY SOMEONE ELSE, HAVE THEM POSITION YOU SO THAT YOU LEAN YOUR HEAD BACKWARDS INTO THE SINK TO AVOID SOAPY WATER FROM GETTING IN YOUR EYES. YOU SHOULD ALSO WEAR YOUR EYE SHIELD TO PREVENT INJURY OR CONTAMINATION TO THE EYE. 13. DO NOT HESITATE TO CALL OUR OFFICE IF YOU FEEL SOMETHING IS NOT RIGHT OR YOU HAVE ANY QUESTIONS, UNUSUAL SYMPTOMS, OR SUDDEN CHANGES IN YOUR VISION. 14. [facility] OFF[facility]              [phone]              *[place] OFFICE         [phone]    15. [address] YOU FOR ENTRUSTING YOUR EYE CARE TO US.    10.  YOU HAVE RECEIVED SEDATION AS PART OF YOUR PROCEDURE. NO DRIVING OR OPERATING HEAVY MACHINERY FOR 24 HOURS. YOUR                       BALANCE AND JUDGEMENT MAY BE IMPAIRED. DROWSINESS MAY ALSO OCCUR.

## 2022-07-06 NOTE — BRIEF OP NOTE
Brief Postoperative Note    Patient: Fariba Robertson  YOB: 1940  MRN: [id number]    Date of Procedure: 7/6/2022     Pre-Op Diagnosis: AGE RELATED NUCLEAR CATARACT LEFT EYE    Post-Op Diagnosis: pseudophakia left eye      Procedure(s):  LEFT EYE CATARACT EXTRACTION WITH INTRA OCULAR LENS IMPLANT (TOPICAL, MAC)    Surgeon(s):  Mo Qureshi MD    Surgical Assistant: None    Anesthesia: MAC     Estimated Blood Loss (mL): none    Complications: None    Specimens: * No specimens in log *     Implants:   Implant Name Type Inv.  Item Serial No.  Lot No. LRB No. Used Action   LENS INTOCU 6.0 TO 30.0 DIOPT 118.7 A CONSTANT 0DEG ANG - [id number] Intraocular Lens LENS INTOCU 6.0 TO 30.0 DIOPT 118.7 A CONSTANT 0DEG ANG [id number] [id number]  Left 1 Implanted       Drains: * No LDAs found *    Findings: cataract    Electronically Signed by Lucila Ennis MD on 7/6/2022 at 1:41 PM

## 2022-07-06 NOTE — INTERVAL H&P NOTE
The patient was counseled at length about the risks of melanie Covid-19 during their perioperative period and any recovery window from their procedure. The patient was made aware that melanie Covid-19  may worsen their prognosis for recovering from their procedure and lend to a higher morbidity and/or mortality risk. All material risks, benefits, and reasonable alternatives including postponing the procedure were discussed. The patient does  wish to proceed with the procedure at this time. Update History & Physical    The Patient's History and Physical of June 28, 2022 was reviewed with the patient and I examined the patient. There was no change. The surgical site was confirmed by the patient and me. Plan:  The risk, benefits, expected outcome, and alternative to the recommended procedure have been discussed with the patient. Patient understands and wants to proceed with the procedure.     Electronically signed by Magdaleno Ferguson MD on 7/6/2022 at 12:56 PM

## 2022-07-06 NOTE — ANESTHESIA POSTPROCEDURE EVALUATION
Procedure(s):  LEFT EYE CATARACT EXTRACTION WITH INTRA OCULAR LENS IMPLANT (TOPICAL, MAC).     MAC    Anesthesia Post Evaluation      Multimodal analgesia: multimodal analgesia not used between 6 hours prior to anesthesia start to PACU discharge  Patient location during evaluation: bedside  Patient participation: complete - patient participated  Level of consciousness: awake and alert  Pain score: 0  Pain management: adequate  Airway patency: patent  Anesthetic complications: no  Cardiovascular status: acceptable  Respiratory status: acceptable  Hydration status: acceptable  Post anesthesia nausea and vomiting:  none  Final Post Anesthesia Temperature Assessment:  Normothermia (36.0-37.5 degrees C)      INITIAL Post-op Vital signs:   Vitals Value Taken Time   /64 07/06/22 1340   Temp 36.7 °C (98 °F) 07/06/22 1340   Pulse 79 07/06/22 1340   Resp 16 07/06/22 1340   SpO2 97 % 07/06/22 1340

## 2022-07-11 ENCOUNTER — ANESTHESIA EVENT (OUTPATIENT)
Dept: SURGERY | Age: 82
End: 2022-07-11
Payer: MEDICARE

## 2022-07-11 DIAGNOSIS — E11.21 TYPE 2 DIABETES WITH NEPHROPATHY (HCC): ICD-10-CM

## 2022-07-11 RX ORDER — GABAPENTIN 100 MG/1
CAPSULE ORAL
Qty: 90 CAPSULE | Refills: 1 | Status: SHIPPED | OUTPATIENT
Start: 2022-07-11

## 2022-07-12 PROBLEM — H25.11 AGE-RELATED NUCLEAR CATARACT, RIGHT EYE: Status: ACTIVE | Noted: 2022-07-12

## 2022-07-12 PROBLEM — H25.11 AGE-RELATED NUCLEAR CATARACT, RIGHT EYE: Chronic | Status: ACTIVE | Noted: 2022-07-12

## 2022-07-13 ENCOUNTER — HOSPITAL ENCOUNTER (OUTPATIENT)
Age: 82
Setting detail: OUTPATIENT SURGERY
Discharge: HOME | End: 2022-07-13
Attending: OPHTHALMOLOGY | Admitting: OPHTHALMOLOGY
Payer: MEDICARE

## 2022-07-13 ENCOUNTER — ANESTHESIA (OUTPATIENT)
Dept: SURGERY | Age: 82
End: 2022-07-13
Payer: MEDICARE

## 2022-07-13 VITALS
TEMPERATURE: 97.9 F | SYSTOLIC BLOOD PRESSURE: 132 MMHG | HEART RATE: 77 BPM | DIASTOLIC BLOOD PRESSURE: 63 MMHG | RESPIRATION RATE: 16 BRPM | OXYGEN SATURATION: 95 %

## 2022-07-13 PROBLEM — H25.11 AGE-RELATED NUCLEAR CATARACT, RIGHT EYE: Status: RESOLVED | Noted: 2022-07-12 | Resolved: 2022-07-13

## 2022-07-13 PROBLEM — H25.11 AGE-RELATED NUCLEAR CATARACT, RIGHT EYE: Chronic | Status: RESOLVED | Noted: 2022-07-12 | Resolved: 2022-07-13

## 2022-07-13 LAB
GLUCOSE BLD STRIP.AUTO-MCNC: 121 MG/DL (ref 65–117)
SERVICE CMNT-IMP: ABNORMAL

## 2022-07-13 PROCEDURE — 76060000031 HC ANESTHESIA FIRST 0.5 HR: Performed by: OPHTHALMOLOGY

## 2022-07-13 PROCEDURE — 77030013353: Performed by: OPHTHALMOLOGY

## 2022-07-13 PROCEDURE — 77030035283: Performed by: OPHTHALMOLOGY

## 2022-07-13 PROCEDURE — 76010000154 HC OR TIME FIRST 0.5 HR: Performed by: OPHTHALMOLOGY

## 2022-07-13 PROCEDURE — 77030013987 HC SLV OPTH IRR ALCN -B: Performed by: OPHTHALMOLOGY

## 2022-07-13 PROCEDURE — 74011000250 HC RX REV CODE- 250: Performed by: OPHTHALMOLOGY

## 2022-07-13 PROCEDURE — 77030018831 HC SOL IRR H20 BAXT -A: Performed by: OPHTHALMOLOGY

## 2022-07-13 PROCEDURE — 82962 GLUCOSE BLOOD TEST: CPT

## 2022-07-13 PROCEDURE — 74011250636 HC RX REV CODE- 250/636: Performed by: ANESTHESIOLOGY

## 2022-07-13 PROCEDURE — 74011250636 HC RX REV CODE- 250/636: Performed by: OPHTHALMOLOGY

## 2022-07-13 PROCEDURE — 2709999900 HC NON-CHARGEABLE SUPPLY: Performed by: OPHTHALMOLOGY

## 2022-07-13 PROCEDURE — 74011000250 HC RX REV CODE- 250

## 2022-07-13 PROCEDURE — 76210000020 HC REC RM PH II FIRST 0.5 HR: Performed by: OPHTHALMOLOGY

## 2022-07-13 PROCEDURE — 77030007855 HC KNF OPHTH IKNF ALCN -A: Performed by: OPHTHALMOLOGY

## 2022-07-13 PROCEDURE — V2630 ANTER CHAMBER INTRAOCUL LENS: HCPCS | Performed by: OPHTHALMOLOGY

## 2022-07-13 PROCEDURE — 77030014067 HC TIP PHACO KLMN ALCN -B: Performed by: OPHTHALMOLOGY

## 2022-07-13 PROCEDURE — 77030020828: Performed by: OPHTHALMOLOGY

## 2022-07-13 DEVICE — IMPLANTABLE DEVICE: Type: IMPLANTABLE DEVICE | Site: EYE | Status: FUNCTIONAL

## 2022-07-13 RX ORDER — LIDOCAINE HYDROCHLORIDE 20 MG/ML
2 INJECTION, SOLUTION EPIDURAL; INFILTRATION; INTRACAUDAL; PERINEURAL ONCE
Status: COMPLETED | OUTPATIENT
Start: 2022-07-13 | End: 2022-07-13

## 2022-07-13 RX ORDER — KETOROLAC TROMETHAMINE 5 MG/ML
1 SOLUTION OPHTHALMIC
Status: COMPLETED | OUTPATIENT
Start: 2022-07-13 | End: 2022-07-13

## 2022-07-13 RX ORDER — TETRACAINE HYDROCHLORIDE 5 MG/ML
SOLUTION OPHTHALMIC AS NEEDED
Status: DISCONTINUED | OUTPATIENT
Start: 2022-07-13 | End: 2022-07-13 | Stop reason: HOSPADM

## 2022-07-13 RX ORDER — TETRACAINE HYDROCHLORIDE 5 MG/ML
1 SOLUTION OPHTHALMIC
Status: COMPLETED | OUTPATIENT
Start: 2022-07-13 | End: 2022-07-13

## 2022-07-13 RX ORDER — MIDAZOLAM HYDROCHLORIDE 1 MG/ML
INJECTION, SOLUTION INTRAMUSCULAR; INTRAVENOUS AS NEEDED
Status: DISCONTINUED | OUTPATIENT
Start: 2022-07-13 | End: 2022-07-13 | Stop reason: HOSPADM

## 2022-07-13 RX ORDER — PHENYLEPHRINE HYDROCHLORIDE 100 MG/ML
1 SOLUTION/ DROPS OPHTHALMIC
Status: COMPLETED | OUTPATIENT
Start: 2022-07-13 | End: 2022-07-13

## 2022-07-13 RX ORDER — TROPICAMIDE 10 MG/ML
1 SOLUTION/ DROPS OPHTHALMIC
Status: COMPLETED | OUTPATIENT
Start: 2022-07-13 | End: 2022-07-13

## 2022-07-13 RX ORDER — TETRACAINE HYDROCHLORIDE 5 MG/ML
2 SOLUTION OPHTHALMIC
Status: DISPENSED | OUTPATIENT
Start: 2022-07-13 | End: 2022-07-13

## 2022-07-13 RX ORDER — TOBRAMYCIN AND DEXAMETHASONE 3; 1 MG/ML; MG/ML
1 SUSPENSION/ DROPS OPHTHALMIC AS NEEDED
Status: COMPLETED | OUTPATIENT
Start: 2022-07-13 | End: 2022-07-13

## 2022-07-13 RX ORDER — SODIUM CHLORIDE, SODIUM LACTATE, POTASSIUM CHLORIDE, CALCIUM CHLORIDE 600; 310; 30; 20 MG/100ML; MG/100ML; MG/100ML; MG/100ML
25 INJECTION, SOLUTION INTRAVENOUS CONTINUOUS
Status: DISPENSED | OUTPATIENT
Start: 2022-07-13 | End: 2022-07-13

## 2022-07-13 RX ADMIN — PHENYLEPHRINE HYDROCHLORIDE 1 DROP: 100 SOLUTION/ DROPS OPHTHALMIC at 11:42

## 2022-07-13 RX ADMIN — TROPICAMIDE 1 DROP: 10 SOLUTION/ DROPS OPHTHALMIC at 11:42

## 2022-07-13 RX ADMIN — TETRACAINE HYDROCHLORIDE 1 DROP: 5 SOLUTION OPHTHALMIC at 11:37

## 2022-07-13 RX ADMIN — MIDAZOLAM 1 MG: 1 INJECTION INTRAMUSCULAR; INTRAVENOUS at 12:49

## 2022-07-13 RX ADMIN — MIDAZOLAM 1 MG: 1 INJECTION INTRAMUSCULAR; INTRAVENOUS at 12:47

## 2022-07-13 RX ADMIN — SODIUM CHLORIDE, POTASSIUM CHLORIDE, SODIUM LACTATE AND CALCIUM CHLORIDE 25 ML/HR: 600; 310; 30; 20 INJECTION, SOLUTION INTRAVENOUS at 12:08

## 2022-07-13 RX ADMIN — LIDOCAINE HYDROCHLORIDE 2 DROP: 35 GEL OPHTHALMIC at 11:37

## 2022-07-13 RX ADMIN — PHENYLEPHRINE HYDROCHLORIDE 1 DROP: 100 SOLUTION/ DROPS OPHTHALMIC at 12:00

## 2022-07-13 RX ADMIN — KETOROLAC TROMETHAMINE 1 DROP: 0.5 SOLUTION OPHTHALMIC at 11:37

## 2022-07-13 RX ADMIN — TROPICAMIDE 1 DROP: 10 SOLUTION/ DROPS OPHTHALMIC at 12:00

## 2022-07-13 RX ADMIN — TETRACAINE HYDROCHLORIDE 1 DROP: 5 SOLUTION OPHTHALMIC at 12:00

## 2022-07-13 RX ADMIN — KETOROLAC TROMETHAMINE 1 DROP: 0.5 SOLUTION OPHTHALMIC at 11:42

## 2022-07-13 RX ADMIN — TROPICAMIDE 1 DROP: 10 SOLUTION/ DROPS OPHTHALMIC at 11:37

## 2022-07-13 RX ADMIN — PHENYLEPHRINE HYDROCHLORIDE 1 DROP: 100 SOLUTION/ DROPS OPHTHALMIC at 11:37

## 2022-07-13 RX ADMIN — TETRACAINE HYDROCHLORIDE 1 DROP: 5 SOLUTION OPHTHALMIC at 11:42

## 2022-07-13 RX ADMIN — KETOROLAC TROMETHAMINE 1 DROP: 0.5 SOLUTION OPHTHALMIC at 12:00

## 2022-07-13 NOTE — OP NOTES
[facility]  OPERATIVE REPORT    Name:  Stuart Chiu  MR#:  [id number]  :  1940  ACCOUNT #:  [de-identified]  DATE OF SERVICE:  2022    PREOPERATIVE DIAGNOSIS:  Age-related nuclear cataract, right eye. POSTOPERATIVE DIAGNOSIS:  Pseudophakia, right eye. PROCEDURE PERFORMED:  Phacoemulsification of cataract of right eye with intraocular lens implant. SURGEON:  Hemant Barros MD    ASSISTANT:  None. ANESTHESIA:  Topical 3.5% lidocaine gel, 0.5% tetracaine drops, IV sedation by Anesthesia, and 2% preservative-free intraocular lidocaine. COMPLICATIONS:  None. SPECIMENS REMOVED:  None. IMPLANTS:  IOL. ESTIMATED BLOOD LOSS:  None. INDICATIONS:  This 72-year-old white female noted painless progressive decreased visual acuity in both eyes secondary to cataract formation affecting distance and near vision, driving and reading, and not improved by refraction. She underwent phacoemulsification of cataract with lens implant in her left eye on 2022, and presents now for an elective extracapsular cataract extraction with lens implant in her right eye to improve vision and lifestyle. In addition to cataracts and adult-onset diabetes, she has had a total knee replacement, has hyperlipidemia, osteoporosis, hypercalcemia, carpal tunnel syndrome, hypertension, history of colonoscopy, and primary hyperparathyroidism. ALLERGIES:  SHE HAS ALLERGIES TO STREPTOMYCIN. She was cleared for surgery by Chao Cortés MD.  Ocular examination at the time of admission reveals a best corrected visual acuity with glare of 20/50 in the right eye and 20/400 in the left eye with intraocular pressures of 12 mmHg in the right eye and 9 mmHg in the left eye. Extraocular examination reveals full fields to confrontation, exophthalmos, and dermatochalasis. Anterior segment shows normal conjunctiva with clear corneas and healed incisions in the left eye.   She has a small limbal scar from pterygium surgery in her left eye. Anterior chamber angles are open, chambers are deep and clear, with normal iris and pupil, and she dilates well. She has a 6+ nuclear sclerotic lens in the right eye and a posterior chamber IOL with clear capsule in the left eye. Dilated fundus shows a 0.8 cup-to-disk ratio in either eye with normal blood vessels and dull foveal reflexes with no evidence of diabetic ocular changes. PROCEDURE:  The patient was taken to the main operating room after receiving pupillary dilation, application of Honan balloon and topical anesthesia in the preop area, placed in the supine position and given IV sedation by Anesthesia. The patient was prepped and draped in the standard fashion for intraocular microsurgery of the right eye with a temporal approach. A limbal paracentesis was made with a 15-degree blade and the anterior chamber filled with Viscoat. A thu keratome was used to enter the anterior chamber from the temporal limbus in a four-plane fashion creating a 3 mm self-sealing corneal tunnel incision. An anterior capsulorrhexis was performed with capsulorrhexis forceps followed by hydrodissection and hydrodelineation of the nucleus in the capsular bag with balanced salt solution achieving rotation. A CDE of 15.75, phacoemulsification time of 90.9 seconds, at an average power of 3.8% was required to remove the nucleus in a phaco chop technique in burst mode at high vacuum using OZil technology followed by irrigation/aspiration of the remaining cortex and vacuum polishing of the posterior capsule. The capsular bag was then filled with Provisc and an Janes AcrySof posterior chamber foldable acrylic intraocular lens, model [id number], power +18.0 diopters, was placed in an Janes , injected into the eye, placing the leading haptic of the lens in the sulcus and dialing the trailing end with a Kuglen hook. The lens centered.     Irrigation/aspiration was used to remove the Provisc from the anterior chamber and capsular bag. The anterior chamber was filled with balanced salt solution and the incisions tested and found to be watertight without a suture. A collagen shield soaked in Tobradex ophthalmic drops and tetracaine drops was placed on the cornea followed by Pred-G ophthalmic ointment. The speculum and drape were then removed and an eye shield taped over the eye. The patient tolerated the procedure well and left the operating room for the step-down unit under the care of Anesthesia, in a satisfactory postop condition, alert and awake. The patient is to be discharged home when released by Anesthesia, to remain at bed rest with head elevated for the next 24 hours, resume all customary preop diet and medications and take Tylenol as needed for discomfort. A followup appointment is scheduled in my office on 07/14/2022 at 3:30 p.m.       Dana More MD      [id number]  D:  07/13/2022 13:33  T:  07/13/2022 16:08  JOB #:  [id number]

## 2022-07-13 NOTE — BRIEF OP NOTE
2  Brief Postoperative Note    Patient: Brett Talley  YOB: 1940  MRN: [id number]    Date of Procedure: 7/13/2022     Pre-Op Diagnosis: Age-related nuclear cataract of right eye [H25.11]    Post-Op Diagnosis: pseudophakia right eye      Procedure(s):  RIGHT EYE EXTRACAPSULAR CATARACT REMOVAL WITH INSERTION OF INTRA OCULAR LENS IMPLANT (SECOND EYE) (MAC/TOPICAL)    Surgeon(s):  Romulo Pisano MD    Surgical Assistant: None    Anesthesia: MAC     Estimated Blood Loss (mL): none    Complications: None    Specimens: * No specimens in log *     Implants:   Implant Name Type Inv.  Item Serial No.  Lot No. LRB No. Used Action   Janes Acryl Sof [id number] Intraocular Lens  [id number] [facility] [id number] Right 1 Implanted       Drains: * No LDAs found *    Findings: cataract    Electronically Signed by Emeka Pineda MD on 7/13/2022 at 1:19 PM

## 2022-07-13 NOTE — INTERVAL H&P NOTE
The patient was counseled at length about the risks of melanie Covid-19 during their perioperative period and any recovery window from their procedure. The patient was made aware that melanie Covid-19  may worsen their prognosis for recovering from their procedure and lend to a higher morbidity and/or mortality risk. All material risks, benefits, and reasonable alternatives including postponing the procedure were discussed. The patient does  wish to proceed with the procedure at this time. Update History & Physical    The Patient's History and Physical of June 28, 2022 was reviewed with the patient and I examined the patient. There was no change. The surgical site was confirmed by the patient and me. Plan:  The risk, benefits, expected outcome, and alternative to the recommended procedure have been discussed with the patient. Patient understands and wants to proceed with the procedure.     Electronically signed by Allen Zee MD on 7/13/2022 at 12:21 PM

## 2022-07-13 NOTE — PERIOP NOTES
Patient mets discharge criteria. Patient alert and oriented. Patient dressed and not complaining of nausea or vomiting. Tolerating fluids. Patient wheeled out to caregiver and questions answered.

## 2022-07-13 NOTE — ANESTHESIA POSTPROCEDURE EVALUATION
Procedure(s):  RIGHT EYE EXTRACAPSULAR CATARACT REMOVAL WITH INSERTION OF INTRA OCULAR LENS IMPLANT (SECOND EYE) (MAC/TOPICAL).     MAC    Anesthesia Post Evaluation      Multimodal analgesia: multimodal analgesia not used between 6 hours prior to anesthesia start to PACU discharge  Patient location during evaluation: bedside  Patient participation: complete - patient participated  Level of consciousness: awake and alert  Pain score: 0  Pain management: adequate  Airway patency: patent  Anesthetic complications: no  Cardiovascular status: acceptable  Respiratory status: acceptable  Hydration status: acceptable  Post anesthesia nausea and vomiting:  none  Final Post Anesthesia Temperature Assessment:  Normothermia (36.0-37.5 degrees C)      INITIAL Post-op Vital signs:   Vitals Value Taken Time   /63 07/13/22 1316   Temp 36.6 °C (97.9 °F) 07/13/22 1316   Pulse 77 07/13/22 1316   Resp 16 07/13/22 1316   SpO2 95 % 07/13/22 1316

## 2022-07-13 NOTE — DISCHARGE INSTRUCTIONS
[facility] EYE [facility]     POST OPERATIVE INSTRUCTIONS AND INFORMATION                1. THE FIRST 24 HOURS AFTER SURGERY, YOU WILL BE ASKED TO REMAIN AT BEDREST WITH YOUR HEAD ELEVATED AT 39 DEGREES THIS CAN BE DONE BY SLEEPING ON THE PILLOWS OR IN A RECLINER. YOU CAN GET UP AS NECESSARY. IF AN EYE SHIELD IS PLACED, IT MUST REMAIN FIRMLY IN PLACE FOR THE FIRST 24 HOURS. IT WILL BE REMOVED IN THE OFFICE ON THE DAY FOLLOWING SURGERY WHEN I EXAMINE YOUR EYE. IF YOU ARE DISCHARGED WITH SUN GLASSES, THEY ARE TO BE WORN UNTIL BEDTIME WHEN AN EYE SHIELD IS TO BE TAPED OVER THE OPERATED EYE FOR SLEEP. 2.  YOU WILL NEED TO BRING ALL EYE MEDICATIONS TO YOUR APPOINTMENT THE DAY AFTER SURGERY. 3.   YOUR POST OP VISIT SCHEDULE IS AS FOLLOWS:             * ONE DAY AFTER SURGERY             * ONE WEEK AFTER SURGERY             * SIX WEEKS AFTER SURGERY (SPECTACLE REFRACTION AND DILATED EXAMINATION)     4. IT IS IMPORTANT THAT YOU WEAR EYE PROTECTION AT ALL TIMES FOR THE FIRST WEEK AFTER [facility] CATARACT SURGERY. DURING THE DAY, YOU WILL NEED TO WEAR EITHER OUR CURRENT SPECTACLES WITH A PLAIN WINDOW GLASS LENS OR YOU MAY WISH TO PURCHASE A PAIR OF DRUG STORE BIFOCALS WITH A POWER OF +2.50 OR SO. WHEN OUTSIDE, YOU MUST WEAR THE SOLAR LUEVANO THAT ARE PROVIDED FOR YOU. AT BEDTIME, YOU MUST WEAR THE EYE SHIELD THAT IS ALSO PROVIDED. AGAIN THIS IS FOR THE FIRST WEEK FOLLOWING YOUR SURGERY.      5. IMPORTANT DOS AND DONTS:             *AVOID CONSTIPATION             *DO NOT PARTICIPATE IN SPORTS OR STRENUOUS PHYSICAL ACTIVITY INCLUDINGSEXUAL RELATIONS             *DO NOT DRIVE FOR ONE WEEK OFTER EACH CATARACT SURGERY             *AVOID POWDERS, SPRAYS, OR OTHER THINGS THAT MIGHT GET IN YOUR EYES             *DO NOT RUB YOUR EYE OR PUT ANY PRESSURE ON YOUR EYE     6. IF YOUR HAIR IS WASHED BY SOMEONE ELSE, HAVE THEM POSITION YOU SO THAT YOU LEAN YOUR HEAD BACKWARDS INTO THE SINK TO AVOID SOAPY WATER FROM GETTING IN YOUR EYES. YOU SHOULD ALSO WEAR YOUR EYE SHIELD TO PREVENT INJURY OR CONTAMINATION TO THE EYE. 7. DO NOT HESITATE TO CALL OUR OFFICE IF YOU FEEL SOMETHING IS NOT RIGHT OR YOU HAVE ANY QUESTIONS, UNUSUAL SYMPTOMS, OR SUDDEN CHANGES IN YOUR VISION. 8. OUR TELEPHONE [facility] OFFICE               [phone]              *[place] OFFICE         [phone]           9. [address] YOU FOR ENTRUSTING YOUR EYE CARE TO US!             10.  YOU HAVE RECEIVED SEDATION AS PART OF YOUR PROCEDURE. NO DRIVING OR OPERATING HEAVY MACHINERY FOR 24 HOURS. YOUR BALANCE AND JUDGEMENT MAY BE IMPAIRED. DROWSINESS MAY ALSO OCCUR.

## 2022-10-03 ENCOUNTER — OFFICE VISIT (OUTPATIENT)
Dept: FAMILY MEDICINE CLINIC | Age: 82
End: 2022-10-03
Payer: MEDICARE

## 2022-10-03 VITALS
SYSTOLIC BLOOD PRESSURE: 135 MMHG | BODY MASS INDEX: 28.17 KG/M2 | RESPIRATION RATE: 20 BRPM | HEART RATE: 83 BPM | DIASTOLIC BLOOD PRESSURE: 70 MMHG | OXYGEN SATURATION: 97 % | HEIGHT: 61 IN | WEIGHT: 149.2 LBS | TEMPERATURE: 97.6 F

## 2022-10-03 DIAGNOSIS — E21.0 PRIMARY HYPERPARATHYROIDISM (HCC): ICD-10-CM

## 2022-10-03 DIAGNOSIS — E11.9 TYPE 2 DIABETES MELLITUS WITHOUT COMPLICATION, WITHOUT LONG-TERM CURRENT USE OF INSULIN (HCC): Primary | ICD-10-CM

## 2022-10-03 DIAGNOSIS — E78.2 MIXED HYPERLIPIDEMIA: ICD-10-CM

## 2022-10-03 DIAGNOSIS — I10 PRIMARY HYPERTENSION: ICD-10-CM

## 2022-10-03 PROCEDURE — 99214 OFFICE O/P EST MOD 30 MIN: CPT | Performed by: FAMILY MEDICINE

## 2022-10-03 NOTE — PROGRESS NOTES
Chief Complaint   Patient presents with    Hypertension     6 month follow up    Diabetes     Dilip Santana is a [de-identified] y.o. female     Subjective:   Diabetes. Sugars well controlled well on last A1c. Running mid t100's on on meter. Controlled on rybelsus. jax well. Hypoglycemia: none  Tolerating current treatment well  Current medications include Metformin ER 2000mg every day, Rybelsus 3mg/d, and farxiga 5mg /day (didn't improve with trial of farxiga 10mg/d). Lab Results   Component Value Date/Time    Hemoglobin A1c 7.0 (H) 03/30/2022 02:17 PM    Hemoglobin A1c 6.5 (H) 10/13/2021 02:56 PM    Hemoglobin A1c 6.1 (H) 07/12/2021 02:33 PM    Glucose 116 (H) 03/30/2022 02:17 PM    Glucose (POC) 121 (H) 07/13/2022 12:04 PM    Microalbumin/Creat ratio (mg/g creat) 19 04/07/2021 02:37 PM    Microalbumin,urine random 0.55 04/07/2021 02:37 PM    Microalbumin/creat ratio (POC) <30 11/06/2017 12:00 PM    LDL,Direct 111 (H) 03/30/2022 02:17 PM    LDL, calculated Not calculated due to elevated triglyceride level 03/30/2022 02:17 PM    Creatinine 1.00 03/30/2022 02:17 PM     Last eye exam performed 6/2022 with Dr. Micheal Laura. No DR per pt. Last foot exam with Podiatry in [address] [place] [address] Dr. Francheska Marin, no abn. Hyperlipidemia. No problems with crestor since visit. Taking crestor 5mg 3x/ week. Daily doses have caused myalgias, so we are con't at [facility] for now. Lab Results   Component Value Date/Time    Cholesterol, total 219 (H) 03/30/2022 02:17 PM    HDL Cholesterol 38 03/30/2022 02:17 PM    LDL,Direct 111 (H) 03/30/2022 02:17 PM    LDL, calculated Not calculated due to elevated triglyceride level 03/30/2022 02:17 PM    VLDL, calculated  03/30/2022 02:17 PM     Calculation not valid with this patient's other Lipid values. Triglyceride 594 (H) 03/30/2022 02:17 PM    CHOL/HDL Ratio 5.8 (H) 03/30/2022 02:17 PM     Lab Results   Component Value Date/Time    ALT (SGPT) 28 10/13/2021 02:56 PM    Alk.  phosphatase 76 10/13/2021 02:56 PM    Bilirubin, total 0.6 10/13/2021 02:56 PM       PMH, , Medications/Allergies: reviewed, on chart. Current Outpatient Medications   Medication Sig    gabapentin (NEURONTIN) 100 mg capsule TAKE 1 CAPSULE BY MOUTH EVERY NIGHT. MAX DAILY AMOUNT: 100 MG. INDICATIONS: NERVE PAIN    cholecalciferol (VITAMIN D3) (2,000 UNITS /50 MCG) cap capsule Take 2,000 Units by mouth daily. semaglutide (RYBELSUS) 3 mg tablet Take 1 Tablet by mouth Daily (before breakfast). Indications: sugar    lisinopriL (PRINIVIL, ZESTRIL) 20 mg tablet TAKE 1 TABLET BY MOUTH EVERY DAY FOR PRESSURE AND HEART    metFORMIN ER (GLUCOPHAGE XR) 500 mg tablet TAKE 4 TABLETS BY MOUTH EVERY DAY FOR SUGAR    OneTouch Delica Plus Lancet 33 gauge misc USE ONE STRIP TO CHECK SUGAR DAILY AS DIRECTED    OneTouch Ultra Test strip USE TO CHECK BLOOD SUGAR DAILY    dapagliflozin (Farxiga) 10 mg tab tablet One half to one pill daily for sugar    allopurinoL (ZYLOPRIM) 300 mg tablet TAKE 1 TABLET BY MOUTH ONCE DAILY FOR GOUT    rosuvastatin (CRESTOR) 5 mg tablet TAKE 1 TABLET BY MOUTH EVERY OTHER DAY    amLODIPine (NORVASC) 2.5 mg tablet Take 1 Tab by mouth daily as needed (high blood pressure over 150mmHg). Indications: pressure    cyanocobalamin (VITAMIN B12) 500 mcg tablet Take 500 mcg by mouth daily. potassium 99 mg tablet Take 99 mg by mouth daily. ascorbic acid, vitamin C, (VITAMIN C) 500 mg tablet Take 500 mg by mouth two (2) times a day. aspirin delayed-release 81 mg tablet Take  by mouth daily. magnesium oxide (MAG-OX) 400 mg tablet Take 400 mg by mouth daily. naproxen sodium 220 mg cap Take 3 Tabs by mouth daily. No current facility-administered medications for this visit.       ROS:  Constitutional: No fever, chills or abnormal weight loss  Respiratory: No cough, SOB   CV: No chest pain or Palpitations    Visit Vitals  /70 (BP 1 Location: Right arm, BP Patient Position: Sitting, BP Cuff Size: Adult long)   Pulse 83   Temp 97.6 °F (36.4 °C) (Temporal)   Resp 20   Ht 5' 1\" (1.549 m)   Wt 149 lb 3.2 oz (67.7 kg)   SpO2 97%   BMI 28.19 kg/m²     -0#  Wt Readings from Last 3 Encounters:   10/03/22 149 lb 3.2 oz (67.7 kg)   07/06/22 149 lb (67.6 kg)   06/28/22 149 lb (67.6 kg)     BP Readings from Last 3 Encounters:   10/03/22 135/70   07/13/22 132/63   07/06/22 112/64     Physical Examination: General appearance - alert, well appearing, and in no distress  Mental status - alert, oriented to person, place, and time  Eyes - pupils equal and reactive, extraocular eye movements intact  ENT - bilateral external ears and nose normal. Normal lips  Neck - supple, no significant adenopathy, no thyromegaly or mass  Lymphatics - no palpable lymphadenopathy, no hepatosplenomegaly  Chest - clear to auscultation, no wheezes, rales or rhonchi, symmetric air entry. Heart - normal rate, regular rhythm, normal S1, S2, no murmurs, rubs, clicks or gallops  Extremities - peripheral pulses normal, no pedal edema, no clubbing or cyanosis. A/P:  DM2  Doing ok lately on rybelsus 3mg/d. Wt stable. Con't current tx. Check A1c, BMP, adj PRN. Lipids  Jax the crestor 5mg TIW ok. Decent control on max jax dose. Con't current tx. HTN  Well controlled. con't plain lisinopril 20mg every day. Recheck labs. Adj PRN. Hypercalcemia  C/W primary hyperPTH, with occ BP spikes. Con't amlodipine 2.5mg daily PRN high blood pressure >150mmHg. Has plenty. Recheck labs. Avoid thiazides for now. Lab Results   Component Value Date/Time    Calcium 11.6 (H) 03/30/2022 02:17 PM    Phosphorus 3.0 01/28/2019 11:00 AM    PTH, Intact 37 01/28/2019 11:00 AM     Cataracts  Did well with Phaco/IOL. Monitor.  Will see eye in summer 2023    F/U 6mo/ PRN

## 2022-10-03 NOTE — PROGRESS NOTES
Mony Avilez is a [de-identified] y.o. female presenting for/with:    Chief Complaint   Patient presents with    Hypertension     6 month follow up    Diabetes       Visit Vitals  BP (!) 142/84   Pulse 83   Temp 97.6 °F (36.4 °C) (Temporal)   Resp 20   Ht 5' 1\" (1.549 m)   Wt 149 lb 3.2 oz (67.7 kg)   SpO2 97%   BMI 28.19 kg/m²     Pain Scale: 0 - No pain/10  Pain Location:     1. \"Have you been to the ER, urgent care clinic since your last visit? Hospitalized since your last visit? \" No    2. \"Have you seen or consulted any other health care providers outside of the [address] [place] [address] since your last visit? \" No     3. For patients aged 39-70: Has the patient had a colonoscopy / FIT/ Cologuard? NA - based on age      If the patient is female:    4. For patients aged 41-77: Has the patient had a mammogram within the past 2 years? NA - based on age or sex      11. For patients aged 21-65: Has the patient had a pap smear? NA - based on age or sex      Symptom review:  Learning Assessment 10/13/2021   PRIMARY LEARNER Patient   PRIMARY LANGUAGE ENGLISH   LEARNER PREFERENCE PRIMARY READING     -   ANSWERED BY patient   RELATIONSHIP SELF     Fall Risk Assessment, last 12 mths 6/28/2022   Able to walk? Yes   Fall in past 12 months? 1   Do you feel unsteady? 0   Are you worried about falling 0   Is TUG test greater than 12 seconds? -   Is the gait abnormal? 0   Number of falls in past 12 months 1   Fall with injury? 0       3 most recent PHQ Screens 6/28/2022   PHQ Not Done -   Little interest or pleasure in doing things Not at all   Feeling down, depressed, irritable, or hopeless Not at all   Total Score PHQ 2 0     Abuse Screening Questionnaire 10/3/2022   Do you ever feel afraid of your partner? N   Are you in a relationship with someone who physically or mentally threatens you? N   Is it safe for you to go home?  Y       ADL Assessment 10/3/2022   Feeding yourself No Help Needed   Getting from bed to chair No Help Needed Getting dressed No Help Needed   Bathing or showering No Help Needed   Walk across the room (includes cane/walker) No Help Needed   Using the telphone No Help Needed   Taking your medications No Help Needed   Preparing meals No Help Needed   Managing money (expenses/bills) No Help Needed   Moderately strenuous housework (laundry) No Help Needed   Shopping for personal items (toiletries/medicines) No Help Needed   Shopping for groceries No Help Needed   Driving No Help Needed   Climbing a flight of stairs No Help Needed   Getting to places beyond walking distances No Help Needed      Advance Care Planning 10/3/2022   Patient's Healthcare Decision Maker is: Named in scanned ACP document   Confirm Advance Directive Yes, on file

## 2022-10-04 LAB
ALBUMIN SERPL-MCNC: 4.5 G/DL (ref 3.5–5)
ALBUMIN/GLOB SERPL: 1.5 {RATIO} (ref 1.1–2.2)
ALP SERPL-CCNC: 77 U/L (ref 45–117)
ALT SERPL-CCNC: 32 U/L (ref 12–78)
ANION GAP SERPL CALC-SCNC: 5 MMOL/L (ref 5–15)
AST SERPL-CCNC: 17 U/L (ref 15–37)
BILIRUB SERPL-MCNC: 0.4 MG/DL (ref 0.2–1)
BUN SERPL-MCNC: 27 MG/DL (ref 6–20)
BUN/CREAT SERPL: 24 (ref 12–20)
CALCIUM SERPL-MCNC: 11.3 MG/DL (ref 8.5–10.1)
CHLORIDE SERPL-SCNC: 104 MMOL/L (ref 97–108)
CHOLEST SERPL-MCNC: 197 MG/DL
CO2 SERPL-SCNC: 27 MMOL/L (ref 21–32)
CREAT SERPL-MCNC: 1.11 MG/DL (ref 0.55–1.02)
CREAT UR-MCNC: 76.1 MG/DL
EST. AVERAGE GLUCOSE BLD GHB EST-MCNC: 140 MG/DL
GLOBULIN SER CALC-MCNC: 3 G/DL (ref 2–4)
GLUCOSE SERPL-MCNC: 105 MG/DL (ref 65–100)
HBA1C MFR BLD: 6.5 % (ref 4–5.6)
HDLC SERPL-MCNC: 41 MG/DL
HDLC SERPL: 4.8 {RATIO} (ref 0–5)
LDLC SERPL CALC-MCNC: ABNORMAL MG/DL (ref 0–100)
LDLC SERPL DIRECT ASSAY-MCNC: 111 MG/DL (ref 0–100)
MICROALBUMIN UR-MCNC: 5.37 MG/DL
MICROALBUMIN/CREAT UR-RTO: 71 MG/G (ref 0–30)
POTASSIUM SERPL-SCNC: 4.9 MMOL/L (ref 3.5–5.1)
PROT SERPL-MCNC: 7.5 G/DL (ref 6.4–8.2)
SODIUM SERPL-SCNC: 136 MMOL/L (ref 136–145)
TRIGL SERPL-MCNC: 428 MG/DL (ref ?–150)
VLDLC SERPL CALC-MCNC: ABNORMAL MG/DL

## 2023-01-25 DIAGNOSIS — E11.21 TYPE 2 DIABETES WITH NEPHROPATHY (HCC): ICD-10-CM

## 2023-01-25 RX ORDER — GABAPENTIN 100 MG/1
CAPSULE ORAL
Qty: 90 CAPSULE | Refills: 1 | Status: SHIPPED | OUTPATIENT
Start: 2023-01-25

## 2023-02-21 ENCOUNTER — OFFICE VISIT (OUTPATIENT)
Dept: FAMILY MEDICINE CLINIC | Age: 83
End: 2023-02-21
Payer: MEDICARE

## 2023-02-21 VITALS
HEIGHT: 61 IN | HEART RATE: 92 BPM | WEIGHT: 147 LBS | RESPIRATION RATE: 18 BRPM | TEMPERATURE: 97.8 F | BODY MASS INDEX: 27.75 KG/M2 | OXYGEN SATURATION: 96 % | DIASTOLIC BLOOD PRESSURE: 68 MMHG | SYSTOLIC BLOOD PRESSURE: 128 MMHG

## 2023-02-21 DIAGNOSIS — E11.9 TYPE 2 DIABETES MELLITUS WITHOUT COMPLICATION, WITHOUT LONG-TERM CURRENT USE OF INSULIN (HCC): ICD-10-CM

## 2023-02-21 DIAGNOSIS — S39.012A STRAIN OF LUMBAR REGION, INITIAL ENCOUNTER: Primary | ICD-10-CM

## 2023-02-21 DIAGNOSIS — E78.00 PURE HYPERCHOLESTEROLEMIA: ICD-10-CM

## 2023-02-21 DIAGNOSIS — E21.0 PRIMARY HYPERPARATHYROIDISM (HCC): ICD-10-CM

## 2023-02-21 PROCEDURE — 1090F PRES/ABSN URINE INCON ASSESS: CPT | Performed by: FAMILY MEDICINE

## 2023-02-21 PROCEDURE — G8417 CALC BMI ABV UP PARAM F/U: HCPCS | Performed by: FAMILY MEDICINE

## 2023-02-21 PROCEDURE — G8510 SCR DEP NEG, NO PLAN REQD: HCPCS | Performed by: FAMILY MEDICINE

## 2023-02-21 PROCEDURE — 99214 OFFICE O/P EST MOD 30 MIN: CPT | Performed by: FAMILY MEDICINE

## 2023-02-21 PROCEDURE — 3078F DIAST BP <80 MM HG: CPT | Performed by: FAMILY MEDICINE

## 2023-02-21 PROCEDURE — 1123F ACP DISCUSS/DSCN MKR DOCD: CPT | Performed by: FAMILY MEDICINE

## 2023-02-21 PROCEDURE — 1101F PT FALLS ASSESS-DOCD LE1/YR: CPT | Performed by: FAMILY MEDICINE

## 2023-02-21 PROCEDURE — G8427 DOCREV CUR MEDS BY ELIG CLIN: HCPCS | Performed by: FAMILY MEDICINE

## 2023-02-21 PROCEDURE — 3074F SYST BP LT 130 MM HG: CPT | Performed by: FAMILY MEDICINE

## 2023-02-21 PROCEDURE — G8536 NO DOC ELDER MAL SCRN: HCPCS | Performed by: FAMILY MEDICINE

## 2023-02-21 RX ORDER — ROSUVASTATIN CALCIUM 5 MG/1
TABLET, COATED ORAL
Qty: 90 TABLET | Refills: 3 | Status: SHIPPED | OUTPATIENT
Start: 2023-02-21

## 2023-02-21 NOTE — PROGRESS NOTES
Chief Complaint   Patient presents with    Back Pain     Low right sided back pain    Knee Pain     Feels tight, when standing in the morning has pain. Derick Benitez is a [de-identified] y.o. female     Subjective:     LBP, R hip pain, R knee pain  PT reports R low back pain for past 3 weeks. Started after hauling heavy luggage around on a recent trip out of country. Also has had some soreness to the R posterior knee for past week, no trauma, no falls. . Evaluation to date: had xrays and Ortho eval by Nisha in 2018 c/w DJD hips, and had TKR bilat 2011 R and 2012 on L. Currently taking aleve 2 tabs every day-BID, increased about a week ago to 2 tabs BID, and con't on gabapentin 100mg qhs, helps some. Diabetes. Sugars well controlled well on last A1c. Running mid 100's on on meter. Controlled on rybelsus. jax well. Hypoglycemia: none  Tolerating current treatment well  Current medications include Metformin ER 2000mg every day, Rybelsus 3mg/d, and farxiga 5mg /day (didn't improve with trial of farxiga 10mg/d). Lab Results   Component Value Date/Time    Hemoglobin A1c 6.5 (H) 10/03/2022 02:25 PM    Hemoglobin A1c 7.0 (H) 03/30/2022 02:17 PM    Hemoglobin A1c 6.5 (H) 10/13/2021 02:56 PM    Glucose 105 (H) 10/03/2022 02:25 PM    Glucose (POC) 121 (H) 07/13/2022 12:04 PM    Microalbumin/Creat ratio (mg/g creat) 71 (H) 10/03/2022 02:25 PM    Microalbumin,urine random 5.37 10/03/2022 02:25 PM    Microalbumin/creat ratio (POC) <30 11/06/2017 12:00 PM    LDL,Direct 111 (H) 10/03/2022 02:25 PM    LDL, calculated Not calculated due to elevated triglyceride level 10/03/2022 02:25 PM    Creatinine 1.11 (H) 10/03/2022 02:25 PM     Last eye exam performed 6/2022 with Dr. Mark Mayers. No DR per pt. Last foot exam with Podiatry in [address] [place] [address] Dr. Renny Atwood, no abn. Hyperlipidemia. No problems with crestor since visit. Taking crestor 5mg 3x/ week. Daily doses have caused myalgias, so we are con't at [facility] for now.   Lab Results Component Value Date/Time    Cholesterol, total 197 10/03/2022 02:25 PM    HDL Cholesterol 41 10/03/2022 02:25 PM    LDL,Direct 111 (H) 10/03/2022 02:25 PM    LDL, calculated Not calculated due to elevated triglyceride level 10/03/2022 02:25 PM    VLDL, calculated  10/03/2022 02:25 PM     Calculation not valid with this patient's other Lipid values. Triglyceride 428 (H) 10/03/2022 02:25 PM    CHOL/HDL Ratio 4.8 10/03/2022 02:25 PM     Lab Results   Component Value Date/Time    ALT (SGPT) 32 10/03/2022 02:25 PM    Alk. phosphatase 77 10/03/2022 02:25 PM    Bilirubin, total 0.4 10/03/2022 02:25 PM       PMH, SH, Medications/Allergies: reviewed, on chart. Current Outpatient Medications   Medication Sig    rosuvastatin (CRESTOR) 5 mg tablet 1 daliy for cholesterol and heart    gabapentin (NEURONTIN) 100 mg capsule TAKE 1 CAPSULE BY MOUTH EVERY NIGHT. MAX DAILY AMOUNT: 100 MG. INDICATIONS: NERVE PAIN    allopurinoL (ZYLOPRIM) 300 mg tablet TAKE 1 TABLET BY MOUTH EVERY DAY FOR GOUT    cholecalciferol (VITAMIN D3) (2,000 UNITS /50 MCG) cap capsule Take 2,000 Units by mouth daily. semaglutide (RYBELSUS) 3 mg tablet Take 1 Tablet by mouth Daily (before breakfast). Indications: sugar    lisinopriL (PRINIVIL, ZESTRIL) 20 mg tablet TAKE 1 TABLET BY MOUTH EVERY DAY FOR PRESSURE AND HEART    metFORMIN ER (GLUCOPHAGE XR) 500 mg tablet TAKE 4 TABLETS BY MOUTH EVERY DAY FOR SUGAR    OneTouch Delica Plus Lancet 33 gauge misc USE ONE STRIP TO CHECK SUGAR DAILY AS DIRECTED    OneTouch Ultra Test strip USE TO CHECK BLOOD SUGAR DAILY    dapagliflozin (Farxiga) 10 mg tab tablet One half to one pill daily for sugar    amLODIPine (NORVASC) 2.5 mg tablet Take 1 Tab by mouth daily as needed (high blood pressure over 150mmHg). Indications: pressure    cyanocobalamin (VITAMIN B12) 500 mcg tablet Take 500 mcg by mouth daily. potassium 99 mg tablet Take 99 mg by mouth daily.     ascorbic acid, vitamin C, (VITAMIN C) 500 mg tablet Take 500 mg by mouth two (2) times a day. aspirin delayed-release 81 mg tablet Take  by mouth daily. magnesium oxide (MAG-OX) 400 mg tablet Take 400 mg by mouth daily. naproxen sodium 220 mg cap Take 3 Tabs by mouth daily. No current facility-administered medications for this visit. ROS:  Constitutional: No fever, chills or abnormal weight loss  Respiratory: No cough, SOB   CV: No chest pain or Palpitations    Visit Vitals  /68 Comment: after rest   Pulse 92   Temp 97.8 °F (36.6 °C) (Temporal)   Resp 18   Ht 5' 1\" (1.549 m)   Wt 147 lb (66.7 kg)   SpO2 96%   BMI 27.78 kg/m²     -2#  Wt Readings from Last 3 Encounters:   02/21/23 147 lb (66.7 kg)   10/03/22 149 lb 3.2 oz (67.7 kg)   07/06/22 149 lb (67.6 kg)     BP Readings from Last 3 Encounters:   02/21/23 128/68   10/03/22 135/70   07/13/22 132/63     Physical Examination: General appearance - alert, well appearing, and in no distress  Mental status - alert, oriented to person, place, and time  Eyes - pupils equal and reactive, extraocular eye movements intact  ENT - bilateral external ears and nose normal. Normal lips  Neck - supple, no significant adenopathy, no thyromegaly or mass  Lymphatics - no palpable lymphadenopathy, no hepatosplenomegaly  Chest - clear to auscultation, no wheezes, rales or rhonchi, symmetric air entry. Heart - normal rate, regular rhythm, normal S1, S2, no murmurs, rubs, clicks or gallops  Extremities - peripheral pulses normal, no pedal edema, no clubbing or cyanosis. A/P:  LBP  2nd overuse. Gradually improving. Con't aleve 2 tabs BID PRN pain. Consider toradol 30mg IM for worsening pain. ITB syndrome R hip, DJD hip R, mild  Work on hip stretching. Consider recheck Xray hips. DM2  Doing well on current regimen. Con't current tx. Plan recheck A1c, BMP spring 2023 visit. Lipids  Norman the crestor 5mg TIW ok. Ready to boost to 4x/wk. RF ore. HTN  Well controlled. con't plain lisinopril 20mg every day. Recheck labs spring, adj PRN. Hypercalcemia  C/W primary hyperPTH, with occ BP spikes. Con't amlodipine 2.5mg daily PRN high blood pressure >150mmHg. Has plenty. Recheck labs spring,. Avoid thiazides.     Lab Results   Component Value Date/Time    Calcium 11.3 (H) 10/03/2022 02:25 PM    Phosphorus 3.0 01/28/2019 11:00 AM    PTH, Intact 37 01/28/2019 11:00 AM     F/U 3mo/ PRN

## 2023-02-21 NOTE — PROGRESS NOTES
Melissa Lopez is a [de-identified] y.o. female presenting for/with:    Chief Complaint   Patient presents with    Back Pain     Low right sided back pain    Knee Pain     Feels tight, when standing in the morning has pain. There were no vitals taken for this visit. Pain Scale: /10  Pain Location:     1. \"Have you been to the ER, urgent care clinic since your last visit? Hospitalized since your last visit? \" No    2. \"Have you seen or consulted any other health care providers outside of the [address] [place] [address] since your last visit? \" No     3. For patients aged 39-70: Has the patient had a colonoscopy / FIT/ Cologuard? NA - based on age      If the patient is female:    4. For patients aged 41-77: Has the patient had a mammogram within the past 2 years? NA - based on age or sex      11. For patients aged 21-65: Has the patient had a pap smear? NA - based on age or sex      Symptom review:  Learning Assessment 10/13/2021   PRIMARY LEARNER Patient   PRIMARY LANGUAGE ENGLISH   LEARNER PREFERENCE PRIMARY READING     -   ANSWERED BY patient   RELATIONSHIP SELF     Fall Risk Assessment, last 12 mths 2/21/2023   Able to walk? Yes   Fall in past 12 months? 0   Do you feel unsteady? -   Are you worried about falling -   Is TUG test greater than 12 seconds? -   Is the gait abnormal? -   Number of falls in past 12 months 0   Fall with injury? 0       3 most recent PHQ Screens 2/21/2023   PHQ Not Done -   Little interest or pleasure in doing things Not at all   Feeling down, depressed, irritable, or hopeless Not at all   Total Score PHQ 2 0     Abuse Screening Questionnaire 2/21/2023   Do you ever feel afraid of your partner? N   Are you in a relationship with someone who physically or mentally threatens you? N   Is it safe for you to go home?  Y       ADL Assessment 2/21/2023   Feeding yourself No Help Needed   Getting from bed to chair No Help Needed   Getting dressed No Help Needed   Bathing or showering No Help Needed Walk across the room (includes cane/walker) No Help Needed   Using the telphone No Help Needed   Taking your medications No Help Needed   Preparing meals No Help Needed   Managing money (expenses/bills) No Help Needed   Moderately strenuous housework (laundry) No Help Needed   Shopping for personal items (toiletries/medicines) No Help Needed   Shopping for groceries No Help Needed   Driving No Help Needed   Climbing a flight of stairs No Help Needed   Getting to places beyond walking distances No Help Needed      Advance Care Planning 2/21/2023   Patient's Healthcare Decision Maker is: Named in scanned ACP document   Confirm Advance Directive Yes, on file

## 2023-05-05 ENCOUNTER — OFFICE VISIT (OUTPATIENT)
Dept: FAMILY MEDICINE CLINIC | Age: 83
End: 2023-05-05

## 2023-05-05 VITALS
RESPIRATION RATE: 18 BRPM | HEART RATE: 81 BPM | HEIGHT: 61 IN | DIASTOLIC BLOOD PRESSURE: 68 MMHG | SYSTOLIC BLOOD PRESSURE: 140 MMHG | TEMPERATURE: 97.1 F | WEIGHT: 146.4 LBS | OXYGEN SATURATION: 98 % | BODY MASS INDEX: 27.64 KG/M2

## 2023-05-05 DIAGNOSIS — E11.8 TYPE 2 DIABETES MELLITUS WITH COMPLICATION, WITHOUT LONG-TERM CURRENT USE OF INSULIN (HCC): ICD-10-CM

## 2023-05-05 DIAGNOSIS — Z00.00 MEDICARE ANNUAL WELLNESS VISIT, SUBSEQUENT: Primary | ICD-10-CM

## 2023-05-05 DIAGNOSIS — M16.11 PRIMARY OSTEOARTHRITIS OF RIGHT HIP: ICD-10-CM

## 2023-05-05 DIAGNOSIS — Z13.31 DEPRESSION SCREENING: ICD-10-CM

## 2023-05-05 DIAGNOSIS — E11.9 TYPE 2 DIABETES MELLITUS WITHOUT COMPLICATION, WITHOUT LONG-TERM CURRENT USE OF INSULIN (HCC): ICD-10-CM

## 2023-05-05 DIAGNOSIS — M25.551 HIP PAIN, RIGHT: ICD-10-CM

## 2023-05-05 RX ORDER — METFORMIN HYDROCHLORIDE 500 MG/1
TABLET, EXTENDED RELEASE ORAL
Qty: 360 TABLET | Refills: 3 | Status: SHIPPED | OUTPATIENT
Start: 2023-05-05

## 2023-05-26 RX ORDER — BLOOD SUGAR DIAGNOSTIC
STRIP MISCELLANEOUS
Qty: 100 STRIP | Refills: 3 | Status: SHIPPED | OUTPATIENT
Start: 2023-05-26

## 2023-05-26 RX ORDER — BLOOD SUGAR DIAGNOSTIC
STRIP MISCELLANEOUS
COMMUNITY
Start: 2022-04-13

## 2023-05-31 ENCOUNTER — NURSE ONLY (OUTPATIENT)
Age: 83
End: 2023-05-31

## 2023-05-31 DIAGNOSIS — E78.2 MIXED HYPERLIPIDEMIA: Primary | ICD-10-CM

## 2023-05-31 DIAGNOSIS — E11.9 TYPE 2 DIABETES MELLITUS WITHOUT COMPLICATION, WITHOUT LONG-TERM CURRENT USE OF INSULIN (HCC): ICD-10-CM

## 2023-06-01 LAB
ALBUMIN SERPL-MCNC: 4.8 G/DL (ref 3.5–5)
ALBUMIN/GLOB SERPL: 1.8 (ref 1.1–2.2)
ALP SERPL-CCNC: 74 U/L (ref 45–117)
ALT SERPL-CCNC: 28 U/L (ref 12–78)
ANION GAP SERPL CALC-SCNC: 8 MMOL/L (ref 5–15)
AST SERPL-CCNC: 24 U/L (ref 15–37)
BILIRUB SERPL-MCNC: 0.4 MG/DL (ref 0.2–1)
BUN SERPL-MCNC: 36 MG/DL (ref 6–20)
BUN/CREAT SERPL: 32 (ref 12–20)
CALCIUM SERPL-MCNC: 11.9 MG/DL (ref 8.5–10.1)
CHLORIDE SERPL-SCNC: 105 MMOL/L (ref 97–108)
CHOLEST SERPL-MCNC: 173 MG/DL
CO2 SERPL-SCNC: 27 MMOL/L (ref 21–32)
CREAT SERPL-MCNC: 1.13 MG/DL (ref 0.55–1.02)
EST. AVERAGE GLUCOSE BLD GHB EST-MCNC: 140 MG/DL
GLOBULIN SER CALC-MCNC: 2.7 G/DL (ref 2–4)
GLUCOSE SERPL-MCNC: 130 MG/DL (ref 65–100)
HBA1C MFR BLD: 6.5 % (ref 4–5.6)
HDLC SERPL-MCNC: 38 MG/DL
HDLC SERPL: 4.6 (ref 0–5)
LDLC SERPL CALC-MCNC: 60.6 MG/DL (ref 0–100)
POTASSIUM SERPL-SCNC: 4.5 MMOL/L (ref 3.5–5.1)
PROT SERPL-MCNC: 7.5 G/DL (ref 6.4–8.2)
SODIUM SERPL-SCNC: 140 MMOL/L (ref 136–145)
TRIGL SERPL-MCNC: 372 MG/DL
VLDLC SERPL CALC-MCNC: 74.4 MG/DL

## 2023-06-08 ENCOUNTER — TELEPHONE (OUTPATIENT)
Age: 83
End: 2023-06-08

## 2023-06-08 NOTE — TELEPHONE ENCOUNTER
Pt called wanting a refill on test strips she stated she is currently out have been calling since last week about this matter. Pt uses [facility] in [place], please advise.

## 2023-08-04 DIAGNOSIS — G56.00 CARPAL TUNNEL SYNDROME, UNSPECIFIED LATERALITY: Primary | ICD-10-CM

## 2023-08-04 RX ORDER — GABAPENTIN 100 MG/1
CAPSULE ORAL
Qty: 90 CAPSULE | Refills: 1 | Status: SHIPPED | OUTPATIENT
Start: 2023-08-04 | End: 2024-01-31

## 2023-10-30 ENCOUNTER — TELEPHONE (OUTPATIENT)
Age: 83
End: 2023-10-30

## 2023-10-30 DIAGNOSIS — E11.9 TYPE 2 DIABETES MELLITUS WITHOUT COMPLICATION, WITHOUT LONG-TERM CURRENT USE OF INSULIN (HCC): Primary | ICD-10-CM

## 2023-10-30 NOTE — TELEPHONE ENCOUNTER
Called to give condolences for spouse's sudden passing yesterday. Is staying with family members in [facility] over next few mo, would like Rybelsus ore there. Will send. Can margarette other meds PRN. PT gave understanding.

## 2023-11-06 DIAGNOSIS — G56.00 CARPAL TUNNEL SYNDROME, UNSPECIFIED LATERALITY: ICD-10-CM

## 2023-11-06 RX ORDER — LISINOPRIL 20 MG/1
TABLET ORAL
Qty: 90 TABLET | Refills: 1 | Status: SHIPPED | OUTPATIENT
Start: 2023-11-06

## 2023-11-06 RX ORDER — GABAPENTIN 100 MG/1
CAPSULE ORAL
Qty: 90 CAPSULE | Refills: 1 | Status: SHIPPED | OUTPATIENT
Start: 2023-11-06 | End: 2024-05-04

## 2023-11-06 NOTE — TELEPHONE ENCOUNTER
----- Message from [facility] sent at 11/6/2023  1:03 PM EST -----  Subject: Refill Request    QUESTIONS  Name of Medication? lisinopril (PRINIVIL;ZESTRIL) 20 MG tablet  Patient-reported dosage and instructions? 1 pill daily  How many days do you have left? 3  Preferred Pharmacy? [facility]ACY #[id number] Pharmacy phone number (if available)? [phone]  Additional Information for Provider? Patient is out of state in [place], [place] and has only 3 days of medication left. Pt needs refill sent to [facility] in   [place].   ---------------------------------------------------------------------------  --------------,  Name of Medication? gabapentin (NEURONTIN) 100 MG capsule  Patient-reported dosage and instructions? 1 pill at nighttime  How many days do you have left? 1  Preferred Pharmacy? [facility]ACY #[id number] Pharmacy phone number (if available)? [phone]  Additional Information for Provider? Patient is out of state in [place], [place] and has only1 nights dose of medication left. Pt needs refill sent to   [facility] in [place].   ---------------------------------------------------------------------------  --------------  [address] [place]  What is the best way for the office to contact you? Do not leave any   message, patient will call back for answer  Preferred Call Back Phone Number? [phone]  ---------------------------------------------------------------------------  --------------  SCRIPT ANSWERS  Relationship to Patient?  Self

## 2023-11-14 DIAGNOSIS — E11.9 TYPE 2 DIABETES MELLITUS WITHOUT COMPLICATION, WITHOUT LONG-TERM CURRENT USE OF INSULIN (HCC): Primary | ICD-10-CM

## 2023-11-14 RX ORDER — BLOOD SUGAR DIAGNOSTIC
STRIP MISCELLANEOUS
Qty: 100 STRIP | Refills: 3 | Status: SHIPPED | OUTPATIENT
Start: 2023-11-14 | End: 2023-11-15 | Stop reason: SDUPTHER

## 2023-11-15 DIAGNOSIS — E11.9 TYPE 2 DIABETES MELLITUS WITHOUT COMPLICATION, WITHOUT LONG-TERM CURRENT USE OF INSULIN (HCC): ICD-10-CM

## 2023-11-15 RX ORDER — BLOOD SUGAR DIAGNOSTIC
STRIP MISCELLANEOUS
Qty: 100 STRIP | Refills: 3 | Status: SHIPPED | OUTPATIENT
Start: 2023-11-15

## 2024-02-13 ENCOUNTER — OFFICE VISIT (OUTPATIENT)
Age: 84
End: 2024-02-13
Payer: MEDICARE

## 2024-02-13 ENCOUNTER — CLINICAL DOCUMENTATION (OUTPATIENT)
Age: 84
End: 2024-02-13

## 2024-02-13 VITALS
WEIGHT: 141.2 LBS | HEART RATE: 90 BPM | TEMPERATURE: 97.1 F | HEIGHT: 61 IN | SYSTOLIC BLOOD PRESSURE: 146 MMHG | OXYGEN SATURATION: 98 % | RESPIRATION RATE: 18 BRPM | DIASTOLIC BLOOD PRESSURE: 68 MMHG | BODY MASS INDEX: 26.66 KG/M2

## 2024-02-13 DIAGNOSIS — E21.0 PRIMARY HYPERPARATHYROIDISM (HCC): ICD-10-CM

## 2024-02-13 DIAGNOSIS — G56.00 CARPAL TUNNEL SYNDROME, UNSPECIFIED LATERALITY: ICD-10-CM

## 2024-02-13 DIAGNOSIS — E83.52 HYPERCALCEMIA: ICD-10-CM

## 2024-02-13 DIAGNOSIS — I10 PRIMARY HYPERTENSION: ICD-10-CM

## 2024-02-13 DIAGNOSIS — M1A.09X0 CHRONIC GOUT OF MULTIPLE SITES, UNSPECIFIED CAUSE: ICD-10-CM

## 2024-02-13 DIAGNOSIS — E11.8 TYPE 2 DIABETES MELLITUS WITH UNSPECIFIED COMPLICATIONS (HCC): Primary | ICD-10-CM

## 2024-02-13 DIAGNOSIS — E78.2 MIXED HYPERLIPIDEMIA: ICD-10-CM

## 2024-02-13 DIAGNOSIS — E11.9 TYPE 2 DIABETES MELLITUS WITHOUT COMPLICATION, WITHOUT LONG-TERM CURRENT USE OF INSULIN (HCC): ICD-10-CM

## 2024-02-13 LAB
ALBUMIN SERPL-MCNC: 4.7 G/DL (ref 3.5–5)
ALBUMIN/GLOB SERPL: 1.7 (ref 1.1–2.2)
ALP SERPL-CCNC: 65 U/L (ref 45–117)
ALT SERPL-CCNC: 20 U/L (ref 12–78)
ANION GAP SERPL CALC-SCNC: 3 MMOL/L (ref 5–15)
AST SERPL-CCNC: 22 U/L (ref 15–37)
BILIRUB SERPL-MCNC: 0.7 MG/DL (ref 0.2–1)
BUN SERPL-MCNC: 42 MG/DL (ref 6–20)
BUN/CREAT SERPL: 36 (ref 12–20)
CALCIUM SERPL-MCNC: 11.4 MG/DL (ref 8.5–10.1)
CHLORIDE SERPL-SCNC: 107 MMOL/L (ref 97–108)
CHOLEST SERPL-MCNC: 153 MG/DL
CO2 SERPL-SCNC: 27 MMOL/L (ref 21–32)
CREAT SERPL-MCNC: 1.17 MG/DL (ref 0.55–1.02)
CREAT UR-MCNC: 68 MG/DL
GLOBULIN SER CALC-MCNC: 2.7 G/DL (ref 2–4)
GLUCOSE SERPL-MCNC: 130 MG/DL (ref 65–100)
HDLC SERPL-MCNC: 41 MG/DL
HDLC SERPL: 3.7 (ref 0–5)
LDLC SERPL CALC-MCNC: 73.6 MG/DL (ref 0–100)
MICROALBUMIN UR-MCNC: 2.79 MG/DL
MICROALBUMIN/CREAT UR-RTO: 41 MG/G (ref 0–30)
POTASSIUM SERPL-SCNC: 4.8 MMOL/L (ref 3.5–5.1)
PROT SERPL-MCNC: 7.4 G/DL (ref 6.4–8.2)
SODIUM SERPL-SCNC: 137 MMOL/L (ref 136–145)
TRIGL SERPL-MCNC: 192 MG/DL
VLDLC SERPL CALC-MCNC: 38.4 MG/DL

## 2024-02-13 PROCEDURE — 3077F SYST BP >= 140 MM HG: CPT | Performed by: FAMILY MEDICINE

## 2024-02-13 PROCEDURE — G8419 CALC BMI OUT NRM PARAM NOF/U: HCPCS | Performed by: FAMILY MEDICINE

## 2024-02-13 PROCEDURE — 99214 OFFICE O/P EST MOD 30 MIN: CPT | Performed by: FAMILY MEDICINE

## 2024-02-13 PROCEDURE — 1036F TOBACCO NON-USER: CPT | Performed by: FAMILY MEDICINE

## 2024-02-13 PROCEDURE — 3078F DIAST BP <80 MM HG: CPT | Performed by: FAMILY MEDICINE

## 2024-02-13 PROCEDURE — 1090F PRES/ABSN URINE INCON ASSESS: CPT | Performed by: FAMILY MEDICINE

## 2024-02-13 PROCEDURE — 1123F ACP DISCUSS/DSCN MKR DOCD: CPT | Performed by: FAMILY MEDICINE

## 2024-02-13 PROCEDURE — G8484 FLU IMMUNIZE NO ADMIN: HCPCS | Performed by: FAMILY MEDICINE

## 2024-02-13 PROCEDURE — G8427 DOCREV CUR MEDS BY ELIG CLIN: HCPCS | Performed by: FAMILY MEDICINE

## 2024-02-13 PROCEDURE — G8399 PT W/DXA RESULTS DOCUMENT: HCPCS | Performed by: FAMILY MEDICINE

## 2024-02-13 RX ORDER — ALLOPURINOL 300 MG/1
TABLET ORAL
Qty: 90 TABLET | Refills: 3 | Status: SHIPPED | OUTPATIENT
Start: 2024-02-13

## 2024-02-13 RX ORDER — LISINOPRIL 20 MG/1
TABLET ORAL
Qty: 90 TABLET | Refills: 3 | Status: SHIPPED | OUTPATIENT
Start: 2024-02-13

## 2024-02-13 RX ORDER — LANCETS 30 GAUGE
1 EACH MISCELLANEOUS DAILY
Qty: 100 EACH | Refills: 5 | Status: SHIPPED | OUTPATIENT
Start: 2024-02-13

## 2024-02-13 RX ORDER — GABAPENTIN 100 MG/1
CAPSULE ORAL
Qty: 90 CAPSULE | Refills: 1 | Status: SHIPPED | OUTPATIENT
Start: 2024-02-13 | End: 2024-08-11

## 2024-02-13 RX ORDER — ROSUVASTATIN CALCIUM 5 MG/1
TABLET, COATED ORAL
Qty: 90 TABLET | Refills: 3 | Status: SHIPPED | OUTPATIENT
Start: 2024-02-13

## 2024-02-13 RX ORDER — BLOOD SUGAR DIAGNOSTIC
STRIP MISCELLANEOUS
Qty: 100 STRIP | Refills: 3 | Status: SHIPPED | OUTPATIENT
Start: 2024-02-13

## 2024-02-13 NOTE — PROGRESS NOTES
Betty Barrios is a 83 y.o. female  Chief Complaint   Patient presents with    Hypertension    Diabetes     Subjective:     Diabetes.  Sugars well controlled well on last A1c. Running mid 100's on on meter. Controlled on rybelsus. lore well.  Hypoglycemia: none  Tolerating current treatment well  Current medications include Metformin ER 2000mg every day, Rybelsus 3mg/d, and farxiga 5mg /day (didn't improve with trial of farxiga 10mg/d).    Lab Results   Component Value Date    LABA1C 6.5 (H) 05/31/2023    LABA1C 6.5 (H) 10/03/2022    LABA1C 7.0 (H) 03/30/2022    GLUCOSE 130 (H) 05/31/2023    CREATININE 1.13 (H) 05/31/2023    LDLCALC 60.6 05/31/2023     Hyperlipidemia.  No problems with crestor since visit. Taking crestor 5mg 3x/ week. Daily doses have caused myalgias, so we are con't at [facility] for now.  Lab Results   Component Value Date    CHOL 173 05/31/2023    HDL 38 05/31/2023    LDLCALC 60.6 05/31/2023    TRIG 372 (H) 05/31/2023    AST 24 05/31/2023    ALT 28 05/31/2023    ALKPHOS 74 05/31/2023    BILITOT 0.4 05/31/2023    LDLDIRECT 111 (H) 10/03/2022     Reviewed PMH, PSH, SH, Medications, allergies (see chart).  Current Outpatient Medications   Medication Sig    blood glucose test strips (ONETOUCH ULTRA) strip USE TO CHECK BLOOD SUGAR ONCE A DAY. Not on Insulin.    DX: E11.9    gabapentin (NEURONTIN) 100 MG capsule TAKE 1 CAPSULE BY MOUTH EVERY NIGHT. MAX DAILY AMOUNT: 100 MG. INDICATIONS: NERVE PAIN    lisinopril (PRINIVIL;ZESTRIL) 20 MG tablet TAKE 1 TABLET BY MOUTH EVERY DAY FOR PRESSURE AND HEART    Semaglutide 3 MG TABS Take 3 mg by mouth every morning (before breakfast)    allopurinol (ZYLOPRIM) 300 MG tablet TAKE 1 TABLET BY MOUTH EVERY DAY FOR GOUT    amLODIPine (NORVASC) 2.5 MG tablet Take 1 tablet by mouth daily as needed    ascorbic acid (VITAMIN C) 500 MG tablet Take 1 tablet by mouth 2 times daily    aspirin 81 MG EC tablet Take by mouth daily    Cholecalciferol 50 MCG (2000 UT) CAPS Take 1

## 2024-02-13 NOTE — PROGRESS NOTES
Betty Barrios is a 83 y.o. female presenting for/with:    Chief Complaint   Patient presents with    Hypertension    Diabetes       Vitals:    02/13/24 1000   BP: (!) 146/68   Pulse: 90   Resp: 18   Temp: 97.1 °F (36.2 °C)   TempSrc: Temporal   SpO2: 98%   Weight: 64 kg (141 lb 3.2 oz)   Height: 1.549 m (5' 1\")       Pain Scale: 0 - No pain/10  Pain Location:     \"Have you been to the ER, urgent care clinic since your last visit?  Hospitalized since your last visit?\"    NO    “Have you seen or consulted any other health care providers outside of [facility] since your last visit?”    NO                 2/13/2024    10:01 AM   PHQ-9    Little interest or pleasure in doing things 0   Feeling down, depressed, or hopeless 0   PHQ-2 Score 0   PHQ-9 Total Score 0           10/13/2021    12:00 AM 7/12/2021    12:00 AM   [facility] AMB LEARNING ASSESSMENT   Primary Learner Patient Patient   Primary Language ENGLISH ENGLISH   Learning Preference READING READING    LISTENING   Answered By patient patient   Relationship to Learner SELF SELF            2/13/2024    10:01 AM   Amb Fall Risk Assessment and TUG Test   Do you feel unsteady or are you worried about falling?  yes   2 or more falls in past year? no   Fall with injury in past year? no           2/13/2024     9:00 AM   ADL ASSESSMENT   Feeding yourself No Help Needed   Getting from bed to chair No Help Needed   Getting dressed No Help Needed   Bathing or showering No Help Needed   Walk across the room (includes cane/walker) No Help Needed   Using the telphone No Help Needed   Taking your medications No Help Needed   Preparing meals No Help Needed   Managing money (expenses/bills) No Help Needed   Moderately strenuous housework (laundry) No Help Needed   Shopping for personal items (toiletries/medicines) No Help Needed   Shopping for groceries No Help Needed   Driving No Help Needed   Climbing a flight of stairs No Help Needed   Getting to places beyond

## 2024-02-14 LAB
EST. AVERAGE GLUCOSE BLD GHB EST-MCNC: 140 MG/DL
HBA1C MFR BLD: 6.5 % (ref 4–5.6)

## 2024-03-06 ENCOUNTER — TELEPHONE (OUTPATIENT)
Age: 84
End: 2024-03-06

## 2024-03-06 DIAGNOSIS — M1A.09X0 CHRONIC GOUT OF MULTIPLE SITES, UNSPECIFIED CAUSE: ICD-10-CM

## 2024-03-06 RX ORDER — ALLOPURINOL 300 MG/1
TABLET ORAL
Qty: 90 TABLET | Refills: 3 | Status: SHIPPED | OUTPATIENT
Start: 2024-03-06

## 2024-03-06 NOTE — TELEPHONE ENCOUNTER
Pt requesting med refill for Allopurinol 300mg be called in to [facility] in [place], [place]    Pharmacy listed in pt chart

## 2024-05-16 DIAGNOSIS — E11.9 TYPE 2 DIABETES MELLITUS WITHOUT COMPLICATION, WITHOUT LONG-TERM CURRENT USE OF INSULIN (HCC): ICD-10-CM

## 2024-05-20 RX ORDER — METFORMIN HYDROCHLORIDE 500 MG/1
TABLET, EXTENDED RELEASE ORAL
Qty: 360 TABLET | Refills: 1 | Status: SHIPPED | OUTPATIENT
Start: 2024-05-20

## 2024-07-13 DIAGNOSIS — E11.9 TYPE 2 DIABETES MELLITUS WITHOUT COMPLICATION, WITHOUT LONG-TERM CURRENT USE OF INSULIN (HCC): ICD-10-CM

## 2024-07-15 RX ORDER — METFORMIN HYDROCHLORIDE 500 MG/1
TABLET, EXTENDED RELEASE ORAL
Qty: 360 TABLET | Refills: 1 | Status: SHIPPED | OUTPATIENT
Start: 2024-07-15

## 2024-08-09 ENCOUNTER — OFFICE VISIT (OUTPATIENT)
Age: 84
End: 2024-08-09

## 2024-08-09 VITALS
WEIGHT: 135.2 LBS | BODY MASS INDEX: 25.53 KG/M2 | DIASTOLIC BLOOD PRESSURE: 60 MMHG | RESPIRATION RATE: 18 BRPM | HEART RATE: 89 BPM | TEMPERATURE: 97.3 F | HEIGHT: 61 IN | SYSTOLIC BLOOD PRESSURE: 130 MMHG | OXYGEN SATURATION: 97 %

## 2024-08-09 DIAGNOSIS — N18.31 TYPE 2 DIABETES MELLITUS WITH STAGE 3A CHRONIC KIDNEY DISEASE, WITHOUT LONG-TERM CURRENT USE OF INSULIN (HCC): ICD-10-CM

## 2024-08-09 DIAGNOSIS — E11.22 TYPE 2 DIABETES MELLITUS WITH STAGE 3A CHRONIC KIDNEY DISEASE, WITHOUT LONG-TERM CURRENT USE OF INSULIN (HCC): ICD-10-CM

## 2024-08-09 DIAGNOSIS — Z00.00 MEDICARE ANNUAL WELLNESS VISIT, SUBSEQUENT: Primary | ICD-10-CM

## 2024-08-09 DIAGNOSIS — E21.0 PRIMARY HYPERPARATHYROIDISM (HCC): ICD-10-CM

## 2024-08-09 DIAGNOSIS — E83.52 HYPERCALCEMIA: ICD-10-CM

## 2024-08-09 DIAGNOSIS — I10 PRIMARY HYPERTENSION: ICD-10-CM

## 2024-08-09 DIAGNOSIS — E78.2 MIXED HYPERLIPIDEMIA: ICD-10-CM

## 2024-08-09 RX ORDER — METFORMIN HYDROCHLORIDE 500 MG/1
TABLET, EXTENDED RELEASE ORAL
Qty: 360 TABLET | Refills: 1 | Status: SHIPPED | OUTPATIENT
Start: 2024-08-09

## 2024-08-09 SDOH — ECONOMIC STABILITY: INCOME INSECURITY: HOW HARD IS IT FOR YOU TO PAY FOR THE VERY BASICS LIKE FOOD, HOUSING, MEDICAL CARE, AND HEATING?: NOT VERY HARD

## 2024-08-09 SDOH — ECONOMIC STABILITY: FOOD INSECURITY: WITHIN THE PAST 12 MONTHS, YOU WORRIED THAT YOUR FOOD WOULD RUN OUT BEFORE YOU GOT MONEY TO BUY MORE.: NEVER TRUE

## 2024-08-09 SDOH — ECONOMIC STABILITY: HOUSING INSECURITY
IN THE LAST 12 MONTHS, WAS THERE A TIME WHEN YOU DID NOT HAVE A STEADY PLACE TO SLEEP OR SLEPT IN A SHELTER (INCLUDING NOW)?: NO

## 2024-08-09 SDOH — ECONOMIC STABILITY: FOOD INSECURITY: WITHIN THE PAST 12 MONTHS, THE FOOD YOU BOUGHT JUST DIDN'T LAST AND YOU DIDN'T HAVE MONEY TO GET MORE.: NEVER TRUE

## 2024-08-09 ASSESSMENT — LIFESTYLE VARIABLES
HOW OFTEN DO YOU HAVE A DRINK CONTAINING ALCOHOL: 2-4 TIMES A MONTH
HOW MANY STANDARD DRINKS CONTAINING ALCOHOL DO YOU HAVE ON A TYPICAL DAY: 1 OR 2

## 2024-08-09 ASSESSMENT — PATIENT HEALTH QUESTIONNAIRE - PHQ9
SUM OF ALL RESPONSES TO PHQ QUESTIONS 1-9: 0
2. FEELING DOWN, DEPRESSED OR HOPELESS: NOT AT ALL
SUM OF ALL RESPONSES TO PHQ QUESTIONS 1-9: 0

## 2024-08-09 NOTE — PROGRESS NOTES
Betty Barrios is a 84 y.o. female  Chief Complaint   Patient presents with    Medicare AWV     PT is back from [place], a little unexpectedly, thinking of moving back [place] after a while, but going back to [place] for now.    Subjective:     Diabetes.  Sugars well controlled well on last A1c. Running mid 100's on on meter. Controlled on rybelsus. lore well.  Hypoglycemia: none  Tolerating current treatment well  Current medications include Metformin ER 2000mg every day, Rybelsus 3mg/d, and farxiga 5mg /day (didn't improve with trial of farxiga 10mg/d).    Lab Results   Component Value Date    LABA1C 6.5 (H) 02/13/2024    LABA1C 6.5 (H) 05/31/2023    LABA1C 6.5 (H) 10/03/2022    GLUCOSE 130 (H) 02/13/2024    CREATININE 1.17 (H) 02/13/2024     Hyperlipidemia.  No problems with crestor since visit. Taking crestor 5mg 3x/ week. Daily doses have caused myalgias, so we are con't at [facility] for now.  Lab Results   Component Value Date    CHOL 153 02/13/2024    HDL 41 02/13/2024    TRIG 192 (H) 02/13/2024    AST 22 02/13/2024    ALT 20 02/13/2024    ALKPHOS 65 02/13/2024    BILITOT 0.7 02/13/2024    LDLDIRECT 111 (H) 10/03/2022     Reviewed PMH, PSH, SH, Medications, allergies (see chart).  Current Outpatient Medications   Medication Sig    metFORMIN (GLUCOPHAGE-XR) 500 MG extended release tablet TAKE 4 TABLETS BY MOUTH EVERY DAY FOR SUGAR    allopurinol (ZYLOPRIM) 300 MG tablet TAKE 1 TABLET BY MOUTH EVERY DAY FOR GOUT    dapagliflozin (FARXIGA) 10 MG tablet One half to one pill daily for sugar    gabapentin (NEURONTIN) 100 MG capsule TAKE 1 CAPSULE BY MOUTH EVERY NIGHT. MAX DAILY AMOUNT: 100 MG. INDICATIONS: NERVE PAIN    lisinopril (PRINIVIL;ZESTRIL) 20 MG tablet TAKE 1 TABLET BY MOUTH EVERY DAY FOR PRESSURE AND HEART    rosuvastatin (CRESTOR) 5 MG tablet 1 daliy for cholesterol and heart    Semaglutide 3 MG TABS Take 3 mg by mouth every morning (before breakfast)    blood glucose test strips (ONETOUCH ULTRA) strip USE TO CHECK

## 2024-08-09 NOTE — PROGRESS NOTES
Chief Complaint   Patient presents with    Medicare AWV       1. Have you been to the ER, urgent care clinic since your last visit?  Hospitalized since your last visit?No    2. Have you seen or consulted any other health care providers outside of the [facility] since your last visit?  Include any pap smears or colon screening. No    Vitals:    08/09/24 1500   BP: 130/60   Pulse: 89   Resp: 18   Temp: 97.3 °F (36.3 °C)   SpO2: 97%            No data to display

## 2024-08-13 LAB
ALBUMIN SERPL-MCNC: 4.9 G/DL (ref 3.7–4.7)
ALP SERPL-CCNC: 75 IU/L (ref 44–121)
ALT SERPL-CCNC: 21 IU/L (ref 0–32)
AST SERPL-CCNC: 18 IU/L (ref 0–40)
BILIRUB SERPL-MCNC: 0.4 MG/DL (ref 0–1.2)
BUN SERPL-MCNC: 34 MG/DL (ref 8–27)
BUN/CREAT SERPL: 28 (ref 12–28)
CALCIUM SERPL-MCNC: 12 MG/DL (ref 8.7–10.3)
CHLORIDE SERPL-SCNC: 99 MMOL/L (ref 96–106)
CO2 SERPL-SCNC: 23 MMOL/L (ref 20–29)
CREAT SERPL-MCNC: 1.2 MG/DL (ref 0.57–1)
EGFRCR SERPLBLD CKD-EPI 2021: 45 ML/MIN/1.73
GLOBULIN SER CALC-MCNC: 2.3 G/DL (ref 1.5–4.5)
GLUCOSE SERPL-MCNC: 165 MG/DL (ref 70–99)
HBA1C MFR BLD: 6.4 % (ref 4.8–5.6)
POTASSIUM SERPL-SCNC: 4.9 MMOL/L (ref 3.5–5.2)
PROT SERPL-MCNC: 7.2 G/DL (ref 6–8.5)
SODIUM SERPL-SCNC: 140 MMOL/L (ref 134–144)

## 2024-08-14 LAB
ALBUMIN/CREAT UR: 186 MG/G CREAT (ref 0–29)
CREAT UR-MCNC: 152.1 MG/DL
MICROALBUMIN UR-MCNC: 283.1 UG/ML

## 2025-01-12 DIAGNOSIS — E11.9 TYPE 2 DIABETES MELLITUS WITHOUT COMPLICATION, WITHOUT LONG-TERM CURRENT USE OF INSULIN (HCC): ICD-10-CM

## 2025-01-12 DIAGNOSIS — G56.00 CARPAL TUNNEL SYNDROME, UNSPECIFIED LATERALITY: ICD-10-CM

## 2025-01-12 DIAGNOSIS — E11.8 TYPE 2 DIABETES MELLITUS WITH UNSPECIFIED COMPLICATIONS (HCC): ICD-10-CM

## 2025-01-14 RX ORDER — ORAL SEMAGLUTIDE 3 MG/1
TABLET ORAL
Qty: 90 TABLET | Refills: 1 | Status: SHIPPED | OUTPATIENT
Start: 2025-01-14

## 2025-01-14 RX ORDER — GABAPENTIN 100 MG/1
CAPSULE ORAL
Qty: 90 CAPSULE | Refills: 0 | Status: SHIPPED | OUTPATIENT
Start: 2025-01-14 | End: 2025-04-14

## 2025-02-20 DIAGNOSIS — E11.9 TYPE 2 DIABETES MELLITUS WITHOUT COMPLICATIONS (HCC): ICD-10-CM

## 2025-02-21 RX ORDER — METFORMIN HYDROCHLORIDE 500 MG/1
TABLET, EXTENDED RELEASE ORAL
Qty: 360 TABLET | Refills: 1 | Status: SHIPPED | OUTPATIENT
Start: 2025-02-21

## 2025-02-24 ENCOUNTER — OFFICE VISIT (OUTPATIENT)
Age: 85
End: 2025-02-24
Payer: MEDICARE

## 2025-02-24 ENCOUNTER — HOSPITAL ENCOUNTER (OUTPATIENT)
Facility: HOSPITAL | Age: 85
Discharge: HOME | End: 2025-02-27
Payer: MEDICARE

## 2025-02-24 VITALS
BODY MASS INDEX: 25.11 KG/M2 | TEMPERATURE: 97.2 F | WEIGHT: 133 LBS | DIASTOLIC BLOOD PRESSURE: 59 MMHG | OXYGEN SATURATION: 95 % | HEART RATE: 95 BPM | RESPIRATION RATE: 18 BRPM | SYSTOLIC BLOOD PRESSURE: 104 MMHG | HEIGHT: 61 IN

## 2025-02-24 DIAGNOSIS — N18.31 TYPE 2 DIABETES MELLITUS WITH STAGE 3A CHRONIC KIDNEY DISEASE, WITHOUT LONG-TERM CURRENT USE OF INSULIN (HCC): ICD-10-CM

## 2025-02-24 DIAGNOSIS — M1A.09X0 CHRONIC GOUT OF MULTIPLE SITES, UNSPECIFIED CAUSE: ICD-10-CM

## 2025-02-24 DIAGNOSIS — I10 PRIMARY HYPERTENSION: ICD-10-CM

## 2025-02-24 DIAGNOSIS — E11.22 TYPE 2 DIABETES MELLITUS WITH STAGE 3A CHRONIC KIDNEY DISEASE, WITHOUT LONG-TERM CURRENT USE OF INSULIN (HCC): ICD-10-CM

## 2025-02-24 DIAGNOSIS — E11.9 TYPE 2 DIABETES MELLITUS WITHOUT COMPLICATION, WITHOUT LONG-TERM CURRENT USE OF INSULIN (HCC): ICD-10-CM

## 2025-02-24 DIAGNOSIS — E78.2 MIXED HYPERLIPIDEMIA: ICD-10-CM

## 2025-02-24 DIAGNOSIS — E11.8 TYPE 2 DIABETES MELLITUS WITH UNSPECIFIED COMPLICATIONS (HCC): ICD-10-CM

## 2025-02-24 DIAGNOSIS — E21.3 HYPERPARATHYROIDISM: ICD-10-CM

## 2025-02-24 DIAGNOSIS — G56.00 CARPAL TUNNEL SYNDROME, UNSPECIFIED LATERALITY: ICD-10-CM

## 2025-02-24 DIAGNOSIS — Z78.0 POSTMENOPAUSAL: Primary | ICD-10-CM

## 2025-02-24 LAB
ALBUMIN SERPL-MCNC: 4.8 G/DL (ref 3.5–5)
ALBUMIN/GLOB SERPL: 1.5 (ref 1.1–2.2)
ALP SERPL-CCNC: 80 U/L (ref 45–117)
ALT SERPL-CCNC: 22 U/L (ref 12–78)
ANION GAP SERPL CALC-SCNC: 8 MMOL/L (ref 2–12)
AST SERPL-CCNC: 14 U/L (ref 15–37)
BILIRUB SERPL-MCNC: 0.6 MG/DL (ref 0.2–1)
BUN SERPL-MCNC: 35 MG/DL (ref 6–20)
BUN/CREAT SERPL: 29 (ref 12–20)
CALCIUM SERPL-MCNC: 11.6 MG/DL (ref 8.5–10.1)
CHLORIDE SERPL-SCNC: 105 MMOL/L (ref 97–108)
CHOLEST SERPL-MCNC: 122 MG/DL
CO2 SERPL-SCNC: 28 MMOL/L (ref 21–32)
CREAT SERPL-MCNC: 1.2 MG/DL (ref 0.55–1.02)
CREAT UR-MCNC: 88 MG/DL
EST. AVERAGE GLUCOSE BLD GHB EST-MCNC: 134 MG/DL
GLOBULIN SER CALC-MCNC: 3.3 G/DL (ref 2–4)
GLUCOSE SERPL-MCNC: 111 MG/DL (ref 65–100)
HBA1C MFR BLD: 6.3 % (ref 4–5.6)
HDLC SERPL-MCNC: 46 MG/DL
HDLC SERPL: 2.7 (ref 0–5)
LDLC SERPL CALC-MCNC: 30.8 MG/DL (ref 0–100)
MICROALBUMIN UR-MCNC: 4.48 MG/DL
MICROALBUMIN/CREAT UR-RTO: 51 MG/G (ref 0–30)
POTASSIUM SERPL-SCNC: 5.4 MMOL/L (ref 3.5–5.1)
PROT SERPL-MCNC: 8.1 G/DL (ref 6.4–8.2)
SODIUM SERPL-SCNC: 141 MMOL/L (ref 136–145)
TRIGL SERPL-MCNC: 226 MG/DL
VLDLC SERPL CALC-MCNC: 45.2 MG/DL

## 2025-02-24 PROCEDURE — 82570 ASSAY OF URINE CREATININE: CPT

## 2025-02-24 PROCEDURE — 82043 UR ALBUMIN QUANTITATIVE: CPT

## 2025-02-24 PROCEDURE — 99214 OFFICE O/P EST MOD 30 MIN: CPT | Performed by: FAMILY MEDICINE

## 2025-02-24 PROCEDURE — 1123F ACP DISCUSS/DSCN MKR DOCD: CPT | Performed by: FAMILY MEDICINE

## 2025-02-24 PROCEDURE — 36415 COLL VENOUS BLD VENIPUNCTURE: CPT

## 2025-02-24 PROCEDURE — 3078F DIAST BP <80 MM HG: CPT | Performed by: FAMILY MEDICINE

## 2025-02-24 PROCEDURE — 1159F MED LIST DOCD IN RCRD: CPT | Performed by: FAMILY MEDICINE

## 2025-02-24 PROCEDURE — 80061 LIPID PANEL: CPT

## 2025-02-24 PROCEDURE — 1160F RVW MEDS BY RX/DR IN RCRD: CPT | Performed by: FAMILY MEDICINE

## 2025-02-24 PROCEDURE — G8399 PT W/DXA RESULTS DOCUMENT: HCPCS | Performed by: FAMILY MEDICINE

## 2025-02-24 PROCEDURE — 1126F AMNT PAIN NOTED NONE PRSNT: CPT | Performed by: FAMILY MEDICINE

## 2025-02-24 PROCEDURE — 1036F TOBACCO NON-USER: CPT | Performed by: FAMILY MEDICINE

## 2025-02-24 PROCEDURE — 80053 COMPREHEN METABOLIC PANEL: CPT

## 2025-02-24 PROCEDURE — 3074F SYST BP LT 130 MM HG: CPT | Performed by: FAMILY MEDICINE

## 2025-02-24 PROCEDURE — 1090F PRES/ABSN URINE INCON ASSESS: CPT | Performed by: FAMILY MEDICINE

## 2025-02-24 PROCEDURE — G8427 DOCREV CUR MEDS BY ELIG CLIN: HCPCS | Performed by: FAMILY MEDICINE

## 2025-02-24 PROCEDURE — G8419 CALC BMI OUT NRM PARAM NOF/U: HCPCS | Performed by: FAMILY MEDICINE

## 2025-02-24 PROCEDURE — 83036 HEMOGLOBIN GLYCOSYLATED A1C: CPT

## 2025-02-24 RX ORDER — LISINOPRIL 20 MG/1
TABLET ORAL
Qty: 90 TABLET | Refills: 3 | Status: SHIPPED | OUTPATIENT
Start: 2025-02-24

## 2025-02-24 RX ORDER — ROSUVASTATIN CALCIUM 5 MG/1
TABLET, COATED ORAL
Qty: 90 TABLET | Refills: 3 | Status: SHIPPED | OUTPATIENT
Start: 2025-02-24

## 2025-02-24 RX ORDER — ORAL SEMAGLUTIDE 3 MG/1
3 TABLET ORAL DAILY
Qty: 90 TABLET | Refills: 3 | Status: SHIPPED | OUTPATIENT
Start: 2025-02-24

## 2025-02-24 RX ORDER — GABAPENTIN 100 MG/1
CAPSULE ORAL
Qty: 90 CAPSULE | Refills: 0 | Status: SHIPPED | OUTPATIENT
Start: 2025-02-24 | End: 2025-05-25

## 2025-02-24 RX ORDER — DAPAGLIFLOZIN 10 MG/1
TABLET, FILM COATED ORAL
Qty: 90 TABLET | Refills: 3 | Status: SHIPPED | OUTPATIENT
Start: 2025-02-24

## 2025-02-24 RX ORDER — METFORMIN HYDROCHLORIDE 500 MG/1
TABLET, EXTENDED RELEASE ORAL
Qty: 360 TABLET | Refills: 1 | Status: SHIPPED | OUTPATIENT
Start: 2025-02-24

## 2025-02-24 RX ORDER — ALLOPURINOL 300 MG/1
TABLET ORAL
Qty: 90 TABLET | Refills: 3 | Status: SHIPPED | OUTPATIENT
Start: 2025-02-24 | End: 2025-02-28

## 2025-02-24 SDOH — ECONOMIC STABILITY: FOOD INSECURITY: WITHIN THE PAST 12 MONTHS, YOU WORRIED THAT YOUR FOOD WOULD RUN OUT BEFORE YOU GOT MONEY TO BUY MORE.: NEVER TRUE

## 2025-02-24 SDOH — ECONOMIC STABILITY: FOOD INSECURITY: WITHIN THE PAST 12 MONTHS, THE FOOD YOU BOUGHT JUST DIDN'T LAST AND YOU DIDN'T HAVE MONEY TO GET MORE.: NEVER TRUE

## 2025-02-24 ASSESSMENT — PATIENT HEALTH QUESTIONNAIRE - PHQ9
2. FEELING DOWN, DEPRESSED OR HOPELESS: NOT AT ALL
SUM OF ALL RESPONSES TO PHQ9 QUESTIONS 1 & 2: 0
SUM OF ALL RESPONSES TO PHQ QUESTIONS 1-9: 0
1. LITTLE INTEREST OR PLEASURE IN DOING THINGS: NOT AT ALL
SUM OF ALL RESPONSES TO PHQ QUESTIONS 1-9: 0

## 2025-02-24 NOTE — PROGRESS NOTES
Betty Barrios is a 84 y.o. female  Chief Complaint   Patient presents with    Diabetes     Eye exam completed in August with Dr Weiler.   Glucose today at 144    Immunizations     Reports getting flu vaccines while in [place] around Sept/Oct    Travel Consult     States will be in [place] from March to May    Skin Problem     Wants a spot on her (L) chin \"zapped\"     PT is back from [place], Back up here, maybe to stay    Subjective:     Diabetes.  Sugars well controlled well on last A1c. Running mid 100's on on meter. Controlled on rybelsus. lore well.  Hypoglycemia: none  Tolerating current treatment well  Current medications include Metformin ER 2000mg every day, Rybelsus 3mg/d, and farxiga 5mg /day (didn't improve with trial of farxiga 10mg/d).    Lab Results   Component Value Date    LABA1C 6.4 (H) 08/12/2024    LABA1C 6.5 (H) 02/13/2024    LABA1C 6.5 (H) 05/31/2023    GLUCOSE 165 (H) 08/12/2024    CREATININE 1.20 (H) 08/12/2024     Lab Results   Component Value Date    ALBCREAT 186 (H) 08/12/2024       Hyperlipidemia.  No problems with crestor since visit. Taking crestor 5mg 3x/ week. Daily doses have caused myalgias, so we are con't at [facility] for now.  Lab Results   Component Value Date    CHOL 153 02/13/2024    HDL 41 02/13/2024    TRIG 192 (H) 02/13/2024    AST 18 08/12/2024    ALT 21 08/12/2024    ALKPHOS 75 08/12/2024    BILITOT 0.4 08/12/2024    LDLDIRECT 111 (H) 10/03/2022     Reviewed PMH, PSH, SH, Medications, allergies (see chart).  Current Outpatient Medications   Medication Sig    metFORMIN (GLUCOPHAGE-XR) 500 MG extended release tablet TAKE 4 TABLETS BY MOUTH EVERY DAY FOR SUGAR    RYBELSUS 3 MG TABS TAKE 1 TAB (3 MG) BY MOUTH EVERY MORNING (BEFORE BREAKFAST)    gabapentin (NEURONTIN) 100 MG capsule Indications: last fill before visit TAKE 1 CAPSULE BY MOUTH EVERY NIGHT. MAX DAILY AMOUNT: 100 MG. FOR NERVE PAIN    allopurinol (ZYLOPRIM) 300 MG tablet TAKE 1 TABLET BY MOUTH EVERY DAY FOR GOUT

## 2025-02-24 NOTE — PATIENT INSTRUCTIONS
TdAP tetanus vaccine due at your pharmacy. Please get when available, can help prevent severe lung disease.

## 2025-02-24 NOTE — PROGRESS NOTES
Betty Barrios is a 84 y.o. female presenting for/with:    Chief Complaint   Patient presents with    Diabetes     Eye exam completed in August with Dr Weiler.   Glucose today at 144    Immunizations     Reports getting flu vaccines while in [place] around Sept/Oct    Travel Consult     States will be in [place] from March to May    Skin Problem     Wants a spot on her (L) chin \"zapped\"       Vitals:    02/24/25 0941   BP: (!) 104/59   Site: Left Upper Arm   Position: Sitting   Cuff Size: Medium Adult   Pulse: 95   Resp: 18   Temp: 97.2 °F (36.2 °C)   TempSrc: Temporal   SpO2: 95%   Weight: 60.3 kg (133 lb)   Height: 1.549 m (5' 1\")       Pain Scale: 0 - No pain/10  Pain Location:     \"Have you been to the ER, urgent care clinic since your last visit?  Hospitalized since your last visit?\"    NO    “Have you seen or consulted any other health care providers outside of [facility] since your last visit?”    NO                 2/24/2025     9:36 AM   PHQ-9    Little interest or pleasure in doing things 0   Feeling down, depressed, or hopeless 0   PHQ-2 Score 0   PHQ-9 Total Score 0           10/13/2021    12:00 AM 7/12/2021    12:00 AM   [facility] AMB LEARNING ASSESSMENT   Primary Learner Patient Patient   Primary Language ENGLISH ENGLISH   Learning Preference READING READING    LISTENING   Answered By patient patient   Relationship to Learner SELF SELF            2/24/2025     9:36 AM   Amb Fall Risk Assessment and TUG Test   Do you feel unsteady or are you worried about falling?  no   2 or more falls in past year? no   Fall with injury in past year? no           2/24/2025     9:00 AM 8/9/2024     3:00 PM 2/13/2024     9:00 AM   ADL ASSESSMENT   Feeding yourself No Help Needed No Help Needed No Help Needed   Getting from bed to chair No Help Needed No Help Needed No Help Needed   Getting dressed No Help Needed No Help Needed No Help Needed   Bathing or showering No Help Needed No Help Needed No Help Needed

## 2025-02-27 DIAGNOSIS — M1A.09X0 CHRONIC GOUT OF MULTIPLE SITES, UNSPECIFIED CAUSE: ICD-10-CM

## 2025-02-27 DIAGNOSIS — E11.8 TYPE 2 DIABETES MELLITUS WITH UNSPECIFIED COMPLICATIONS (HCC): ICD-10-CM

## 2025-02-27 DIAGNOSIS — I10 PRIMARY HYPERTENSION: ICD-10-CM

## 2025-02-27 RX ORDER — DAPAGLIFLOZIN 10 MG/1
TABLET, FILM COATED ORAL
Qty: 90 TABLET | Refills: 3 | OUTPATIENT
Start: 2025-02-27

## 2025-02-27 RX ORDER — LISINOPRIL 20 MG/1
TABLET ORAL
Qty: 90 TABLET | Refills: 3 | OUTPATIENT
Start: 2025-02-27

## 2025-02-28 RX ORDER — ALLOPURINOL 300 MG/1
TABLET ORAL
Qty: 90 TABLET | Refills: 3 | Status: SHIPPED | OUTPATIENT
Start: 2025-02-28

## 2025-03-04 DIAGNOSIS — E11.8 TYPE 2 DIABETES MELLITUS WITH UNSPECIFIED COMPLICATIONS (HCC): ICD-10-CM

## 2025-03-04 DIAGNOSIS — I10 PRIMARY HYPERTENSION: ICD-10-CM

## 2025-03-05 RX ORDER — DAPAGLIFLOZIN 10 MG/1
TABLET, FILM COATED ORAL
Qty: 90 TABLET | Refills: 3 | Status: SHIPPED | OUTPATIENT
Start: 2025-03-05

## 2025-03-05 RX ORDER — LISINOPRIL 20 MG/1
TABLET ORAL
Qty: 90 TABLET | Refills: 3 | Status: SHIPPED | OUTPATIENT
Start: 2025-03-05

## 2025-03-07 DIAGNOSIS — E78.2 MIXED HYPERLIPIDEMIA: ICD-10-CM

## 2025-03-07 DIAGNOSIS — E11.8 TYPE 2 DIABETES MELLITUS WITH UNSPECIFIED COMPLICATIONS (HCC): ICD-10-CM

## 2025-03-07 RX ORDER — ROSUVASTATIN CALCIUM 5 MG/1
TABLET, COATED ORAL
Qty: 90 TABLET | Refills: 3 | OUTPATIENT
Start: 2025-03-07

## 2025-05-02 ENCOUNTER — OFFICE VISIT (OUTPATIENT)
Age: 85
End: 2025-05-02
Payer: MEDICARE

## 2025-05-02 VITALS
HEIGHT: 61 IN | SYSTOLIC BLOOD PRESSURE: 157 MMHG | WEIGHT: 132.8 LBS | RESPIRATION RATE: 20 BRPM | OXYGEN SATURATION: 96 % | HEART RATE: 84 BPM | TEMPERATURE: 98.2 F | DIASTOLIC BLOOD PRESSURE: 73 MMHG | BODY MASS INDEX: 25.07 KG/M2
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
## 2025-05-02 DIAGNOSIS — S00.461A TICK BITE OF RIGHT EAR, INITIAL ENCOUNTER: Primary | ICD-10-CM

## 2025-05-02 PROCEDURE — 1159F MED LIST DOCD IN RCRD: CPT | Performed by: INTERNAL MEDICINE

## 2025-05-02 PROCEDURE — 1123F ACP DISCUSS/DSCN MKR DOCD: CPT | Performed by: INTERNAL MEDICINE

## 2025-05-02 PROCEDURE — 3077F SYST BP >= 140 MM HG: CPT | Performed by: INTERNAL MEDICINE

## 2025-05-02 PROCEDURE — G8399 PT W/DXA RESULTS DOCUMENT: HCPCS | Performed by: INTERNAL MEDICINE

## 2025-05-02 PROCEDURE — G8419 CALC BMI OUT NRM PARAM NOF/U: HCPCS | Performed by: INTERNAL MEDICINE

## 2025-05-02 PROCEDURE — 3078F DIAST BP <80 MM HG: CPT | Performed by: INTERNAL MEDICINE

## 2025-05-02 PROCEDURE — 1090F PRES/ABSN URINE INCON ASSESS: CPT | Performed by: INTERNAL MEDICINE

## 2025-05-02 PROCEDURE — 99213 OFFICE O/P EST LOW 20 MIN: CPT | Performed by: INTERNAL MEDICINE

## 2025-05-02 PROCEDURE — 1036F TOBACCO NON-USER: CPT | Performed by: INTERNAL MEDICINE

## 2025-05-02 PROCEDURE — G8427 DOCREV CUR MEDS BY ELIG CLIN: HCPCS | Performed by: INTERNAL MEDICINE

## 2025-05-02 RX ORDER — DOXYCYCLINE HYCLATE 100 MG
100 TABLET ORAL 2 TIMES DAILY
Qty: 14 TABLET | Refills: 0 | Status: SHIPPED | OUTPATIENT
Start: 2025-05-02 | End: 2025-05-09

## 2025-05-02 NOTE — PROGRESS NOTES
Betty Barrios is a 84 y.o. female presenting for/with:    Chief Complaint   Patient presents with    Tick Removal       Vitals:    05/02/25 1112   BP: (!) 157/73   BP Site: Left Upper Arm   Patient Position: Sitting   BP Cuff Size: Medium Adult   Pulse: 84   Resp: 20   Temp: 98.2 °F (36.8 °C)   TempSrc: Temporal   SpO2: 96%   Weight: 60.2 kg (132 lb 12.8 oz)   Height: 1.549 m (5' 1\")       Pain Scale: /10  Pain Location:     \"Have you been to the ER, urgent care clinic since your last visit?  Hospitalized since your last visit?\"    NO    “Have you seen or consulted any other health care providers outside of [facility] since your last visit?”    NO                 2/24/2025     9:36 AM   PHQ-9    Little interest or pleasure in doing things 0   Feeling down, depressed, or hopeless 0   PHQ-2 Score 0   PHQ-9 Total Score 0           10/13/2021    12:00 AM 7/12/2021    12:00 AM   [facility] AMB LEARNING ASSESSMENT   Primary Learner Patient Patient   Primary Language ENGLISH ENGLISH   Learning Preference READING READING    LISTENING   Answered By patient patient   Relationship to Learner SELF SELF            5/2/2025    11:04 AM   Amb Fall Risk Assessment and TUG Test   Do you feel unsteady or are you worried about falling?  no   2 or more falls in past year? no   Fall with injury in past year? no           5/2/2025    11:00 AM 2/24/2025     9:00 AM 8/9/2024     3:00 PM 2/13/2024     9:00 AM   ADL ASSESSMENT   Feeding yourself No Help Needed No Help Needed No Help Needed No Help Needed   Getting from bed to chair No Help Needed No Help Needed No Help Needed No Help Needed   Getting dressed No Help Needed No Help Needed No Help Needed No Help Needed   Bathing or showering No Help Needed No Help Needed No Help Needed No Help Needed   Walk across the room (includes cane/walker) No Help Needed No Help Needed No Help Needed No Help Needed   Using the telphone No Help Needed No Help Needed No Help Needed No Help

## 2025-05-02 NOTE — PROGRESS NOTES
Assessment & Plan  1. Tick bites.  - Reports multiple tick bites, including one behind the ear that has since disappeared.  - No fever or chills reported.  - Physical examination reveals no remaining ticks; thorough inspection performed.  - Prescription for doxycycline 100 mg, to be taken twice daily for 1 week, will be sent to [facility].          Chief Complaint   Patient presents with    Tick Removal         No orders of the defined types were placed in this encounter.      Fadi Samuel MD, FACP      History of Present Illness  The patient presents for evaluation of tick bites.    She reports the presence of ticks on her thighs and eyes. Additionally, a tick was noted behind her ear this morning, which she was unable to remove due to neuropathy and arthritis. However, it appears to have dislodged itself as it is no longer present. No systemic symptoms such as fever or chills are reported. Her current medication regimen includes a daily dose of half a baby aspirin.           Allergies   Allergen Reactions    Streptomycin      Other reaction(s): Unknown (comments)       Outpatient Encounter Medications as of 5/2/2025   Medication Sig Dispense Refill    doxycycline hyclate (VIBRA-TABS) 100 MG tablet Take 1 tablet by mouth 2 times daily for 7 days 14 tablet 0    lisinopril (PRINIVIL;ZESTRIL) 20 MG tablet TAKE 1 TABLET BY MOUTH EVERY DAY FOR PRESSURE AND HEART 90 tablet 3    dapagliflozin (FARXIGA) 10 MG tablet TAKE 1/2 TO 1 TABLET BY MOUTH EVERY DAY FOR SUGAR 90 tablet 3    allopurinol (ZYLOPRIM) 300 MG tablet TAKE 1 TABLET BY MOUTH EVERY DAY FOR GOUT 90 tablet 3    gabapentin (NEURONTIN) 100 MG capsule Indications: last fill before visit TAKE 1 CAPSULE BY MOUTH EVERY NIGHT. MAX DAILY AMOUNT: 100 MG. FOR NERVE PAIN 90 capsule 0    metFORMIN (GLUCOPHAGE-XR) 500 MG extended release tablet TAKE 4 TABLETS BY MOUTH EVERY DAY FOR SUGAR 360 tablet 1    rosuvastatin (CRESTOR) 5 MG tablet 1 daliy for cholesterol

## 2025-05-30 DIAGNOSIS — M1A.09X0 CHRONIC GOUT OF MULTIPLE SITES, UNSPECIFIED CAUSE: ICD-10-CM

## 2025-05-30 NOTE — TELEPHONE ENCOUNTER
Pt called to request a refill of medications to be called into [facility]:    Allopurinol 300 MG - 90 days supply    Metformin 500 MG (Pt said 7MG?) 90 days supply    Thanks.

## 2025-06-02 RX ORDER — ALLOPURINOL 300 MG/1
TABLET ORAL
Qty: 90 TABLET | Refills: 3 | Status: SHIPPED | OUTPATIENT
Start: 2025-06-02

## 2025-06-02 RX ORDER — METFORMIN HYDROCHLORIDE 500 MG/1
TABLET, EXTENDED RELEASE ORAL
Qty: 360 TABLET | Refills: 3 | Status: SHIPPED | OUTPATIENT
Start: 2025-06-02

## 2025-06-16 DIAGNOSIS — E11.9 TYPE 2 DIABETES MELLITUS WITHOUT COMPLICATION, WITHOUT LONG-TERM CURRENT USE OF INSULIN (HCC): ICD-10-CM

## 2025-06-16 RX ORDER — ORAL SEMAGLUTIDE 3 MG/1
3 TABLET ORAL DAILY
Qty: 90 TABLET | Refills: 3 | Status: SHIPPED | OUTPATIENT
Start: 2025-06-16

## 2025-06-23 ENCOUNTER — TELEPHONE (OUTPATIENT)
Age: 85
End: 2025-06-23

## 2025-06-23 DIAGNOSIS — I10 PRIMARY HYPERTENSION: ICD-10-CM

## 2025-06-23 DIAGNOSIS — E78.2 MIXED HYPERLIPIDEMIA: ICD-10-CM

## 2025-06-23 DIAGNOSIS — E11.8 TYPE 2 DIABETES MELLITUS WITH UNSPECIFIED COMPLICATIONS (HCC): ICD-10-CM

## 2025-06-23 RX ORDER — ROSUVASTATIN CALCIUM 5 MG/1
TABLET, COATED ORAL
Qty: 90 TABLET | Refills: 3 | Status: SHIPPED | OUTPATIENT
Start: 2025-06-23

## 2025-06-23 RX ORDER — DAPAGLIFLOZIN 10 MG/1
TABLET, FILM COATED ORAL
Qty: 90 TABLET | Refills: 3 | Status: SHIPPED | OUTPATIENT
Start: 2025-06-23

## 2025-06-30 ENCOUNTER — TELEPHONE (OUTPATIENT)
Age: 85
End: 2025-06-30

## 2025-06-30 DIAGNOSIS — I10 PRIMARY HYPERTENSION: ICD-10-CM

## 2025-06-30 DIAGNOSIS — G56.00 CARPAL TUNNEL SYNDROME, UNSPECIFIED LATERALITY: ICD-10-CM

## 2025-06-30 DIAGNOSIS — E11.8 TYPE 2 DIABETES MELLITUS WITH UNSPECIFIED COMPLICATIONS (HCC): ICD-10-CM

## 2025-06-30 RX ORDER — GABAPENTIN 100 MG/1
CAPSULE ORAL
Qty: 90 CAPSULE | Refills: 3 | Status: SHIPPED | OUTPATIENT
Start: 2025-06-30 | End: 2025-09-28

## 2025-06-30 RX ORDER — LISINOPRIL 20 MG/1
TABLET ORAL
Qty: 90 TABLET | Refills: 3 | Status: SHIPPED | OUTPATIENT
Start: 2025-06-30

## 2025-06-30 NOTE — TELEPHONE ENCOUNTER
Pt called requesting a refill on Lisinopril and Gabapentin. Please send to [facility] in [place].     Thanks!

## 2025-08-25 ENCOUNTER — OFFICE VISIT (OUTPATIENT)
Age: 85
End: 2025-08-25
Payer: MEDICARE

## 2025-08-25 VITALS
SYSTOLIC BLOOD PRESSURE: 126 MMHG | WEIGHT: 132.8 LBS | BODY MASS INDEX: 25.07 KG/M2 | HEIGHT: 61 IN | DIASTOLIC BLOOD PRESSURE: 62 MMHG | TEMPERATURE: 97.7 F | HEART RATE: 94 BPM | OXYGEN SATURATION: 94 % | RESPIRATION RATE: 18 BRPM

## 2025-08-25 DIAGNOSIS — H90.12 CONDUCTIVE HEARING LOSS OF LEFT EAR WITH UNRESTRICTED HEARING OF RIGHT EAR: ICD-10-CM

## 2025-08-25 DIAGNOSIS — E11.22 TYPE 2 DIABETES MELLITUS WITH STAGE 3A CHRONIC KIDNEY DISEASE, WITHOUT LONG-TERM CURRENT USE OF INSULIN (HCC): ICD-10-CM

## 2025-08-25 DIAGNOSIS — M81.0 AGE-RELATED OSTEOPOROSIS WITHOUT CURRENT PATHOLOGICAL FRACTURE: ICD-10-CM

## 2025-08-25 DIAGNOSIS — E78.2 MIXED HYPERLIPIDEMIA: ICD-10-CM

## 2025-08-25 DIAGNOSIS — N18.31 TYPE 2 DIABETES MELLITUS WITH STAGE 3A CHRONIC KIDNEY DISEASE, WITHOUT LONG-TERM CURRENT USE OF INSULIN (HCC): ICD-10-CM

## 2025-08-25 DIAGNOSIS — Z00.00 MEDICARE ANNUAL WELLNESS VISIT, SUBSEQUENT: Primary | ICD-10-CM

## 2025-08-25 DIAGNOSIS — H61.22 IMPACTED CERUMEN OF LEFT EAR: ICD-10-CM

## 2025-08-25 DIAGNOSIS — I10 PRIMARY HYPERTENSION: ICD-10-CM

## 2025-08-25 PROCEDURE — 1123F ACP DISCUSS/DSCN MKR DOCD: CPT | Performed by: FAMILY MEDICINE

## 2025-08-25 PROCEDURE — 69209 REMOVE IMPACTED EAR WAX UNI: CPT | Performed by: FAMILY MEDICINE

## 2025-08-25 PROCEDURE — 3074F SYST BP LT 130 MM HG: CPT | Performed by: FAMILY MEDICINE

## 2025-08-25 PROCEDURE — 1160F RVW MEDS BY RX/DR IN RCRD: CPT | Performed by: FAMILY MEDICINE

## 2025-08-25 PROCEDURE — 1090F PRES/ABSN URINE INCON ASSESS: CPT | Performed by: FAMILY MEDICINE

## 2025-08-25 PROCEDURE — G8419 CALC BMI OUT NRM PARAM NOF/U: HCPCS | Performed by: FAMILY MEDICINE

## 2025-08-25 PROCEDURE — G8427 DOCREV CUR MEDS BY ELIG CLIN: HCPCS | Performed by: FAMILY MEDICINE

## 2025-08-25 PROCEDURE — G8399 PT W/DXA RESULTS DOCUMENT: HCPCS | Performed by: FAMILY MEDICINE

## 2025-08-25 PROCEDURE — 1125F AMNT PAIN NOTED PAIN PRSNT: CPT | Performed by: FAMILY MEDICINE

## 2025-08-25 PROCEDURE — 3044F HG A1C LEVEL LT 7.0%: CPT | Performed by: FAMILY MEDICINE

## 2025-08-25 PROCEDURE — G0439 PPPS, SUBSEQ VISIT: HCPCS | Performed by: FAMILY MEDICINE

## 2025-08-25 PROCEDURE — 3078F DIAST BP <80 MM HG: CPT | Performed by: FAMILY MEDICINE

## 2025-08-25 PROCEDURE — 1036F TOBACCO NON-USER: CPT | Performed by: FAMILY MEDICINE

## 2025-08-25 PROCEDURE — 99214 OFFICE O/P EST MOD 30 MIN: CPT | Performed by: FAMILY MEDICINE

## 2025-08-25 PROCEDURE — 1159F MED LIST DOCD IN RCRD: CPT | Performed by: FAMILY MEDICINE

## 2025-08-25 ASSESSMENT — PATIENT HEALTH QUESTIONNAIRE - PHQ9
SUM OF ALL RESPONSES TO PHQ QUESTIONS 1-9: 0
SUM OF ALL RESPONSES TO PHQ QUESTIONS 1-9: 0
2. FEELING DOWN, DEPRESSED OR HOPELESS: NOT AT ALL
SUM OF ALL RESPONSES TO PHQ QUESTIONS 1-9: 0
1. LITTLE INTEREST OR PLEASURE IN DOING THINGS: NOT AT ALL
SUM OF ALL RESPONSES TO PHQ QUESTIONS 1-9: 0

## 2025-08-25 ASSESSMENT — LIFESTYLE VARIABLES
HOW MANY STANDARD DRINKS CONTAINING ALCOHOL DO YOU HAVE ON A TYPICAL DAY: PATIENT DOES NOT DRINK
HOW OFTEN DO YOU HAVE A DRINK CONTAINING ALCOHOL: MONTHLY OR LESS

## 2025-08-26 LAB
ALBUMIN SERPL-MCNC: 4.6 G/DL (ref 3.5–5.2)
ALBUMIN/GLOB SERPL: 1.8 (ref 1.1–2.2)
ALP SERPL-CCNC: 74 U/L (ref 35–104)
ALT SERPL-CCNC: 18 U/L (ref 10–35)
ANION GAP SERPL CALC-SCNC: 14 MMOL/L (ref 2–14)
AST SERPL-CCNC: 22 U/L (ref 10–35)
BILIRUB SERPL-MCNC: 0.2 MG/DL (ref 0–1.2)
BUN SERPL-MCNC: 33 MG/DL (ref 8–23)
CALCIUM SERPL-MCNC: 11.6 MG/DL (ref 8.8–10.2)
CHLORIDE SERPL-SCNC: 102 MMOL/L (ref 98–107)
CHOLEST SERPL-MCNC: 143 MG/DL (ref 0–200)
CO2 SERPL-SCNC: 23 MMOL/L (ref 20–29)
CREAT SERPL-MCNC: 1.14 MG/DL (ref 0.6–1)
CREAT UR-MCNC: 80 MG/DL (ref 28–217)
EST. AVERAGE GLUCOSE BLD GHB EST-MCNC: 141 MG/DL
GLOBULIN SER CALC-MCNC: 2.5 G/DL (ref 2–4)
GLUCOSE SERPL-MCNC: 121 MG/DL (ref 65–100)
HBA1C MFR BLD: 6.5 % (ref 4–5.6)
HDLC SERPL-MCNC: 46 MG/DL (ref 40–60)
HDLC SERPL: 3.1 (ref 0–5)
LDLC SERPL CALC-MCNC: 35 MG/DL (ref 0–100)
MICROALBUMIN UR-MCNC: 2.72 MG/DL
MICROALBUMIN/CREAT UR-RTO: 34 MG/G
POTASSIUM SERPL-SCNC: 4.8 MMOL/L (ref 3.5–5.1)
PROT SERPL-MCNC: 7.2 G/DL (ref 6.4–8.3)
SODIUM SERPL-SCNC: 140 MMOL/L (ref 136–145)
TRIGL SERPL-MCNC: 310 MG/DL (ref 0–150)
VLDLC SERPL CALC-MCNC: 62 MG/DL

## (undated) DEVICE — TAPE ADH W1INXL10YD CLTH SILK H2O RESIST CURAD

## (undated) DEVICE — GLOVE ORANGE PI 8   MSG9080

## (undated) DEVICE — BETADINE 5% EYE SOL

## (undated) DEVICE — SOLUTION IRRIG 250ML STRL H2O PLAS POUR BTL USP

## (undated) DEVICE — KIT PT CARE CATRCT POSTOP CUST

## (undated) DEVICE — LABEL STERILIZATION W/PEN -- 50/CA

## (undated) DEVICE — 45° KELMAN®, 0.9 MM TURBOSONICS® MINI-FLARED ABS® TIP: Brand: ALCON, KELMAN, TURBOSONICS, MINI-FLARED ABS

## (undated) DEVICE — MICROSURGICAL INSTRUMENT IRR. CYSTITOME 25GA FORMED-REVERSE CUTTING: Brand: ALCON

## (undated) DEVICE — TIPLESS W/ 0.9 MM HIGH INFUSION SLEEVES: Brand: ALCON, INFINITI, MICROSMOOTH

## (undated) DEVICE — OPHTHALMIC KNIFE 15°: Brand: ALCON

## (undated) DEVICE — B-H IRRIGATING CAN 19GA FLAT ANGLED 8MM: Brand: OPHTHALMIC CANNULA

## (undated) DEVICE — MICROSURGICAL INSTRUMENT CAPSULE POLISHER-37 BEND, 21GA W .3MM SIDE PORT: Brand: ALCON

## (undated) DEVICE — Device

## (undated) DEVICE — TAPE ADH CLTH SILK H2O REPELLENT CURAD

## (undated) DEVICE — SOL IRR SOD CL 0.9% 250ML BTL --

## (undated) DEVICE — MICROSURGICAL INSTRUMENT ANTERIOR CHAMBER CANNULA 30GA: Brand: ALCON